# Patient Record
Sex: FEMALE | Race: WHITE | NOT HISPANIC OR LATINO
[De-identification: names, ages, dates, MRNs, and addresses within clinical notes are randomized per-mention and may not be internally consistent; named-entity substitution may affect disease eponyms.]

---

## 2019-08-28 PROBLEM — Z00.00 ENCOUNTER FOR PREVENTIVE HEALTH EXAMINATION: Status: ACTIVE | Noted: 2019-08-28

## 2019-08-29 ENCOUNTER — APPOINTMENT (OUTPATIENT)
Dept: NEUROSURGERY | Facility: CLINIC | Age: 79
End: 2019-08-29
Payer: MEDICARE

## 2019-08-29 VITALS
SYSTOLIC BLOOD PRESSURE: 124 MMHG | BODY MASS INDEX: 26.68 KG/M2 | RESPIRATION RATE: 18 BRPM | OXYGEN SATURATION: 94 % | HEIGHT: 62 IN | DIASTOLIC BLOOD PRESSURE: 82 MMHG | HEART RATE: 97 BPM | WEIGHT: 145 LBS

## 2019-08-29 DIAGNOSIS — Z83.3 FAMILY HISTORY OF DIABETES MELLITUS: ICD-10-CM

## 2019-08-29 DIAGNOSIS — Z87.39 PERSONAL HISTORY OF OTHER DISEASES OF THE MUSCULOSKELETAL SYSTEM AND CONNECTIVE TISSUE: ICD-10-CM

## 2019-08-29 DIAGNOSIS — R51 HEADACHE: ICD-10-CM

## 2019-08-29 DIAGNOSIS — Z82.61 FAMILY HISTORY OF ARTHRITIS: ICD-10-CM

## 2019-08-29 DIAGNOSIS — Z86.79 PERSONAL HISTORY OF OTHER DISEASES OF THE CIRCULATORY SYSTEM: ICD-10-CM

## 2019-08-29 DIAGNOSIS — Z82.49 FAMILY HISTORY OF ISCHEMIC HEART DISEASE AND OTHER DISEASES OF THE CIRCULATORY SYSTEM: ICD-10-CM

## 2019-08-29 DIAGNOSIS — F17.200 NICOTINE DEPENDENCE, UNSPECIFIED, UNCOMPLICATED: ICD-10-CM

## 2019-08-29 PROCEDURE — 99205 OFFICE O/P NEW HI 60 MIN: CPT

## 2019-09-03 PROBLEM — Z87.39 HISTORY OF ARTHRITIS: Status: RESOLVED | Noted: 2019-08-30 | Resolved: 2019-09-03

## 2019-09-03 PROBLEM — F17.200 CURRENT EVERY DAY SMOKER: Status: ACTIVE | Noted: 2019-08-30

## 2019-09-03 PROBLEM — Z83.3 FAMILY HISTORY OF DIABETES MELLITUS: Status: ACTIVE | Noted: 2019-08-30

## 2019-09-03 PROBLEM — Z86.79 HISTORY OF CORONARY ARTERY DISEASE: Status: RESOLVED | Noted: 2019-08-30 | Resolved: 2019-09-03

## 2019-09-03 PROBLEM — Z82.61 FAMILY HISTORY OF ARTHRITIS: Status: ACTIVE | Noted: 2019-08-30

## 2019-09-03 PROBLEM — Z82.49 FAMILY HISTORY OF CARDIAC DISORDER: Status: ACTIVE | Noted: 2019-08-30

## 2019-09-03 LAB — PA ADP PRP-ACNC: 200 PRU

## 2019-09-03 RX ORDER — ATORVASTATIN CALCIUM 80 MG/1
TABLET, FILM COATED ORAL
Refills: 0 | Status: ACTIVE | COMMUNITY

## 2019-09-03 RX ORDER — CLOPIDOGREL 75 MG/1
TABLET, FILM COATED ORAL
Refills: 0 | Status: ACTIVE | COMMUNITY

## 2019-09-03 RX ORDER — ASPIRIN ENTERIC COATED TABLETS 81 MG 81 MG/1
81 TABLET, DELAYED RELEASE ORAL
Refills: 0 | Status: ACTIVE | COMMUNITY

## 2019-09-03 NOTE — REVIEW OF SYSTEMS
[As Noted in HPI] : as noted in HPI [Facial Weakness] : no facial weakness [Feeling Poorly] : feeling poorly [Leg Weakness] : no leg weakness [Difficulties in Speech] : no speech difficulties [Seizures] : no convulsions [Cluster Headache] : cluster headaches [Lightheadedness] : no lightheadedness [Dizziness] : no dizziness [Migraine Headache] : migraine headaches [Tension Headache] : tension-type headaches [Negative] : Heme/Lymph

## 2019-09-03 NOTE — PHYSICAL EXAM
[General Appearance - In No Acute Distress] : in no acute distress [General Appearance - Alert] : alert [Oriented To Time, Place, And Person] : oriented to person, place, and time [Impaired Insight] : insight and judgment were intact [I: Normal Smell] : smell intact bilaterally [Cranial Nerves Optic (II)] : visual acuity intact bilaterally,  pupils equal round and reactive to light [Cranial Nerves Trigeminal (V)] : facial sensation intact symmetrically [Cranial Nerves Oculomotor (III)] : extraocular motion intact [Cranial Nerves Facial (VII)] : face symmetrical [Cranial Nerves Vestibulocochlear (VIII)] : hearing was intact bilaterally [Cranial Nerves Accessory (XI - Cranial And Spinal)] : head turning and shoulder shrug symmetric [Cranial Nerves Glossopharyngeal (IX)] : tongue and palate midline [Cranial Nerves Hypoglossal (XII)] : there was no tongue deviation with protrusion [Motor Tone] : muscle tone was normal in all four extremities [Motor Strength] : muscle strength was normal in all four extremities [Sensation Tactile Decrease] : light touch was intact [Sclera] : the sclera and conjunctiva were normal [Neck Appearance] : the appearance of the neck was normal [Heart Rate And Rhythm] : heart rate was normal and rhythm regular [Exaggerated Use Of Accessory Muscles For Inspiration] : no accessory muscle use [] : no respiratory distress [Abdomen Soft] : soft [Abnormal Walk] : normal gait [Skin Color & Pigmentation] : normal skin color and pigmentation

## 2019-09-03 NOTE — ADDENDUM
[FreeTextEntry1] : August 29, 2019\par \par Javad Loja MD\par [address]\[place] [address]\par \par Re:	Afia Quigley \par \par Dear Dr. Loja:\par \par I saw Afia Qugiley and her friend in the office today.  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 Quigley reports that she had undergone coiling in 2016 and 2017 by Dr. Oh at [facility] and now presents with MRA evidence of compaction and has been considered for a 3rd procedure.  Given the complexity of the recommendation, she now comes for a 2nd opinion.  \par \par With the exception of coronary artery disease and arthritis, she really has no other major medical problems.  Her current medications are every-other-day Plavix and aspirin as well as rosuvastatin.  She does not drink and does smoke occasionally.  She reports that she is having some memory issues, decreased concentration, cluster headache, limping, and head pressure but no true neurological deficit.  She denies any cardiovascular, respiratory, or GI complaints.  Neurologically, she is intact.\par \par I had the opportunity to review her most recent MRA as well as her prior angiograms.  The angiograms show, in fact, quite good coil embolization of what looks to be a wide-necked 8 mm anterior communicating artery aneurysm.  Unfortunately, the most recent MRA does show a coil compaction with a significant amount of residual aneurysm at the base.\par \par I had a long discussion with Afia.  I do think that early treatment with a stent coil option makes the most sense given the 2nd recurrence and the wide neck of the aneurysm.  While I think watchful waiting could be considered, given that this is a 2nd recurrence in a short period of time, I do think that this is somewhat more risky than a straightforward recoiling using stent support.  I explained the risks, benefits, and alternatives at length.  We additionally reviewed her MRI which shows no evidence of any other abnormality that might be causing her headache, and I think she would like to make a decision as to whether to go ahead after thinking it through with her family and friends.\par \par I will certainly keep you abreast of her progress and appreciate your kind referral.\par \par Sincerely,\par \par \par \par Miguel Lowe MD\par \par DL/ag [facility] #[id number]\par \par CC:	Mihir Villalba MD\par 	[address]\par 	[address]\[place] [address]\par

## 2019-09-03 NOTE — DATA REVIEWED
[de-identified] : HEAD 8/22/19: Status post  endovascular colining of Anterior communicating artery aneurysm with interval revascularization given the new flow related signal in the coil mass (SEE REPORT FOR MORE DETAILS) [de-identified] : cerebral Angiogram: Demonstrate recanalization in coil mass

## 2019-09-03 NOTE — HISTORY OF PRESENT ILLNESS
[FreeTextEntry1] : Persistent Head Pressure [de-identified] : This is a 79 year old woman here for comprehensive evaluations of previously coiled UNRUPTURED anterior communicating artery aneurysm at outside facility at [facility] by Dr Oh. She comes to visit with her friend. She reports that her initial symptoms started with visual difficulty in 2016. She underwent comprehensive diagnostic workup that showed a diagnostic cerebral aneurysm. She underwent endovascular Coiled embolization of cerebral aneurysm in 2016 and then a repeat procedure in 2017. She reports that she fell 6-8 weeks ago  and started to experience head pressure. She follow up with Dr. Oh and Head CT and NEW MRA head ordered to evaluate. No intracranial hemorrhage. She denies any gross neurological deficits.

## 2019-10-10 LAB
PA ADP PRP-ACNC: 180 PRU
PLATELET RESPONSE ASPIRIN: 563 ARU

## 2019-10-11 ENCOUNTER — INPATIENT (INPATIENT)
Facility: HOSPITAL | Age: 79
LOS: 0 days | Discharge: HOME | DRG: 253 | End: 2019-10-12
Attending: NEUROLOGICAL SURGERY | Admitting: NEUROLOGICAL SURGERY
Payer: COMMERCIAL

## 2019-10-11 VITALS
SYSTOLIC BLOOD PRESSURE: 113 MMHG | DIASTOLIC BLOOD PRESSURE: 75 MMHG | OXYGEN SATURATION: 98 % | TEMPERATURE: 98 F | HEART RATE: 100 BPM | RESPIRATION RATE: 21 BRPM

## 2019-10-11 DIAGNOSIS — L76.32 POSTPROCEDURAL HEMATOMA OF SKIN AND SUBCUTANEOUS TISSUE FOLLOWING OTHER PROCEDURE: ICD-10-CM
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
## 2019-10-11 DIAGNOSIS — I67.1 CEREBRAL ANEURYSM, NONRUPTURED: ICD-10-CM

## 2019-10-11 DIAGNOSIS — Z95.5 PRESENCE OF CORONARY ANGIOPLASTY IMPLANT AND GRAFT: ICD-10-CM

## 2019-10-11 DIAGNOSIS — E78.5 HYPERLIPIDEMIA, UNSPECIFIED: ICD-10-CM

## 2019-10-11 DIAGNOSIS — F17.210 NICOTINE DEPENDENCE, CIGARETTES, UNCOMPLICATED: ICD-10-CM

## 2019-10-11 DIAGNOSIS — Z98.890 OTHER SPECIFIED POSTPROCEDURAL STATES: Chronic | ICD-10-CM

## 2019-10-11 DIAGNOSIS — I72.8 ANEURYSM OF OTHER SPECIFIED ARTERIES: ICD-10-CM

## 2019-10-11 DIAGNOSIS — I25.10 ATHEROSCLEROTIC HEART DISEASE OF NATIVE CORONARY ARTERY WITHOUT ANGINA PECTORIS: ICD-10-CM
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
## 2019-10-11 LAB
APTT BLD: 98.2 SEC — HIGH (ref 27.5–36.3)
HCT VFR BLD CALC: 35.9 % — SIGNIFICANT CHANGE UP (ref 34.5–45)
HGB BLD-MCNC: 11.8 G/DL — SIGNIFICANT CHANGE UP (ref 11.5–15.5)
INR BLD: 1.11 — SIGNIFICANT CHANGE UP (ref 0.88–1.16)
MCHC RBC-ENTMCNC: 30.6 PG — SIGNIFICANT CHANGE UP (ref 27–34)
MCHC RBC-ENTMCNC: 32.9 GM/DL — SIGNIFICANT CHANGE UP (ref 32–36)
MCV RBC AUTO: 93.2 FL — SIGNIFICANT CHANGE UP (ref 80–100)
NRBC # BLD: 0 /100 WBCS — SIGNIFICANT CHANGE UP (ref 0–0)
PA ADP PRP-ACNC: 189 PRU — LOW (ref 194–418)
PLATELET # BLD AUTO: 171 K/UL — SIGNIFICANT CHANGE UP (ref 150–400)
PLATELET RESPONSE ASPIRIN RESULT: 401 ARU — SIGNIFICANT CHANGE UP (ref 350–700)
PROTHROM AB SERPL-ACNC: 12.6 SEC — SIGNIFICANT CHANGE UP (ref 10–12.9)
RBC # BLD: 3.85 M/UL — SIGNIFICANT CHANGE UP (ref 3.8–5.2)
RBC # FLD: 13.1 % — SIGNIFICANT CHANGE UP (ref 10.3–14.5)
WBC # BLD: 6.99 K/UL — SIGNIFICANT CHANGE UP (ref 3.8–10.5)
WBC # FLD AUTO: 6.99 K/UL — SIGNIFICANT CHANGE UP (ref 3.8–10.5)

## 2019-10-11 PROCEDURE — 76377 3D RENDER W/INTRP POSTPROCES: CPT | Mod: 26

## 2019-10-11 PROCEDURE — 36217 PLACE CATHETER IN ARTERY: CPT

## 2019-10-11 PROCEDURE — 75898 FOLLOW-UP ANGIOGRAPHY: CPT | Mod: 26

## 2019-10-11 PROCEDURE — 99291 CRITICAL CARE FIRST HOUR: CPT

## 2019-10-11 PROCEDURE — 61624 TCAT PERM OCCLS/EMBOLJ CNS: CPT

## 2019-10-11 PROCEDURE — 99222 1ST HOSP IP/OBS MODERATE 55: CPT

## 2019-10-11 PROCEDURE — 75894 X-RAYS TRANSCATH THERAPY: CPT | Mod: 26

## 2019-10-11 RX ORDER — ACETAMINOPHEN 500 MG
325 TABLET ORAL ONCE
Refills: 0 | Status: COMPLETED | OUTPATIENT
Start: 2019-10-11 | End: 2019-10-11

## 2019-10-11 RX ORDER — DOCUSATE SODIUM 100 MG
100 CAPSULE ORAL
Refills: 0 | Status: DISCONTINUED | OUTPATIENT
Start: 2019-10-11 | End: 2019-10-12

## 2019-10-11 RX ORDER — ASPIRIN/CALCIUM CARB/MAGNESIUM 324 MG
325 TABLET ORAL AT BEDTIME
Refills: 0 | Status: DISCONTINUED | OUTPATIENT
Start: 2019-10-11 | End: 2019-10-11

## 2019-10-11 RX ORDER — ASPIRIN/CALCIUM CARB/MAGNESIUM 324 MG
81 TABLET ORAL DAILY
Refills: 0 | Status: DISCONTINUED | OUTPATIENT
Start: 2019-10-11 | End: 2019-10-11

## 2019-10-11 RX ORDER — ASPIRIN/CALCIUM CARB/MAGNESIUM 324 MG
1 TABLET ORAL
Qty: 0 | Refills: 0 | DISCHARGE

## 2019-10-11 RX ORDER — ONDANSETRON 8 MG/1
4 TABLET, FILM COATED ORAL EVERY 6 HOURS
Refills: 0 | Status: DISCONTINUED | OUTPATIENT
Start: 2019-10-11 | End: 2019-10-12

## 2019-10-11 RX ORDER — ASPIRIN/CALCIUM CARB/MAGNESIUM 324 MG
325 TABLET ORAL DAILY
Refills: 0 | Status: DISCONTINUED | OUTPATIENT
Start: 2019-10-11 | End: 2019-10-12

## 2019-10-11 RX ORDER — ACETAMINOPHEN 500 MG
650 TABLET ORAL EVERY 6 HOURS
Refills: 0 | Status: DISCONTINUED | OUTPATIENT
Start: 2019-10-11 | End: 2019-10-12

## 2019-10-11 RX ORDER — DIAZEPAM 5 MG
1 TABLET ORAL
Qty: 0 | Refills: 0 | DISCHARGE

## 2019-10-11 RX ORDER — CLOPIDOGREL BISULFATE 75 MG/1
75 TABLET, FILM COATED ORAL DAILY
Refills: 0 | Status: DISCONTINUED | OUTPATIENT
Start: 2019-10-11 | End: 2019-10-12

## 2019-10-11 RX ORDER — ERYTHROMYCIN BASE 5 MG/GRAM
1 OINTMENT (GRAM) OPHTHALMIC (EYE)
Refills: 0 | Status: DISCONTINUED | OUTPATIENT
Start: 2019-10-11 | End: 2019-10-11

## 2019-10-11 RX ORDER — SODIUM CHLORIDE 9 MG/ML
1000 INJECTION INTRAMUSCULAR; INTRAVENOUS; SUBCUTANEOUS
Refills: 0 | Status: DISCONTINUED | OUTPATIENT
Start: 2019-10-11 | End: 2019-10-12

## 2019-10-11 RX ORDER — ATORVASTATIN CALCIUM 80 MG/1
40 TABLET, FILM COATED ORAL DAILY
Refills: 0 | Status: DISCONTINUED | OUTPATIENT
Start: 2019-10-11 | End: 2019-10-12

## 2019-10-11 RX ORDER — ROSUVASTATIN CALCIUM 5 MG/1
1 TABLET ORAL
Qty: 0 | Refills: 0 | DISCHARGE

## 2019-10-11 RX ORDER — ERYTHROMYCIN BASE 5 MG/GRAM
1 OINTMENT (GRAM) OPHTHALMIC (EYE)
Refills: 0 | Status: DISCONTINUED | OUTPATIENT
Start: 2019-10-11 | End: 2019-10-12

## 2019-10-11 RX ORDER — CHLORHEXIDINE GLUCONATE 213 G/1000ML
1 SOLUTION TOPICAL ONCE
Refills: 0 | Status: DISCONTINUED | OUTPATIENT
Start: 2019-10-11 | End: 2019-10-12

## 2019-10-11 RX ORDER — BENZOCAINE AND MENTHOL 5; 1 G/100ML; G/100ML
1 LIQUID ORAL
Refills: 0 | Status: DISCONTINUED | OUTPATIENT
Start: 2019-10-11 | End: 2019-10-12

## 2019-10-11 RX ORDER — SENNA PLUS 8.6 MG/1
2 TABLET ORAL AT BEDTIME
Refills: 0 | Status: DISCONTINUED | OUTPATIENT
Start: 2019-10-11 | End: 2019-10-12

## 2019-10-11 RX ADMIN — Medication 325 MILLIGRAM(S): at 21:16

## 2019-10-11 RX ADMIN — SENNA PLUS 2 TABLET(S): 8.6 TABLET ORAL at 21:16

## 2019-10-11 RX ADMIN — Medication 1 DROP(S): at 19:35

## 2019-10-11 RX ADMIN — CLOPIDOGREL BISULFATE 75 MILLIGRAM(S): 75 TABLET, FILM COATED ORAL at 21:16

## 2019-10-11 RX ADMIN — Medication 325 MILLIGRAM(S): at 22:44

## 2019-10-11 RX ADMIN — Medication 1 APPLICATION(S): at 20:03

## 2019-10-11 NOTE — H&P ADULT - NSICDXPASTMEDICALHX_GEN_ALL_CORE_FT
PAST MEDICAL HISTORY:  CAD (coronary artery disease)     Cerebral aneurysm ACOMM    HLD (hyperlipidemia)

## 2019-10-11 NOTE — CHART NOTE - NSCHARTNOTEFT_GEN_A_CORE
Neurosurgical Indications for Screening Dopplers on Admission:       1) Known hypercoagulation disorder (h/o VTE, thrombophilia, HIT, etc.)   2) Admitted from prolonged stay from another institution (straight forward ER transfers not included)  3) Presenting with significant leg immobility   4) Presenting with signs and symptoms of VTE?    5) With significant critical illness, Including "found down" for unknown period of time in HPI  6) With significant neurotrauma (TBI, SCI / TLS spine fractures)   7) Who are comatose   8) With known malignancy (e.g. glioblastoma multiforme, meningioma, etc.). Excludes IA chemo 23hr observation stays  9) On hemodialysis   10) Who have received platelet transfusion or antithrombotic reversal agents recently   11) Who have had recent major orthopedic surgery          Screening dopplers indicated?   Y _   N x  DVT Prophylaxis:  x SCD's   _ chemoprophylaxis

## 2019-10-11 NOTE — H&P ADULT - PROBLEM SELECTOR PLAN 3
Consent in chart, all R/B/A discussed,  Outpatient medical clearance reviewed,  Accumetrics performed pre-procedure,  Plan to admit to ICU post procedure,  D/w Dr. Lowe

## 2019-10-11 NOTE — H&P ADULT - HISTORY OF PRESENT ILLNESS
79 year old female with PMH of HLD, CAD s/p cardiac stents, h/o ACOMM artery aneurysm s/p coiling x2 presents today for possible stent and coil of ACOMM artery aneurysm. Patient has been on Aspirin 325mg and Plavix 75mg daily in anticipation of this procedure and has taken her medication this morning. She denies any headache, visual changes, extremity weakness, syncope, or gait changes. 79 year old female with PMH of HLD, CAD s/p cardiac stents, h/o ACOMM artery aneurysm s/p coiling x2 presents today for possible stent and coil of ACOMM artery aneurysm. Patient has been on Aspirin 81mg and Plavix 75mg daily in anticipation of this procedure and has taken a full dose Aspirin this morning for repeat accumetrics. Her aneurysm was originally discovered for work-up for headaches and visual changes. She reports intermittent mild headaches but otherwise no new neurological symptoms.

## 2019-10-11 NOTE — CONSULT NOTE ADULT - ASSESSMENT
Assessment: 79yFemale with PMH of HLD, CAD s/p cardiac stents, h/o ACOMM artery aneurysm  s/p femoral cerebral angiogram ACOMM artery stent placement now w/ R groin hematoma.    - 2 discrete Hematomas size of a quarter lateral and medial thigh  -Continue to monitor q1hr  -trend cbc (hgb 11 postop)  -continue supine bedrest  -will con't to follow

## 2019-10-11 NOTE — PROGRESS NOTE ADULT - SUBJECTIVE AND OBJECTIVE BOX
79/F with hx of CAD s/p PCI, Acomm aneurysm s/p 2 prior coiling now here for re-occlusion of aneurysm via stenting.  Was on ASA and plavix prior to procedure.      Post-op with groin hematoma    Femoral cerebral angiogram performed under general anesthesia using a 6Fr long sheath via right CFA. Left ICA injection with 3D spin performed demonstrates previously coiled ACOMM artery aneurysm. [place] stent 3x24mm deployed covering the ACOMM artery. Patient was Heparinized for the procedure and stopped at the end of the procedure, ACT at the end was 301. Patient successfully extubated and remains neurologically and hemodynamically stable.      PMH:   aneurysm per above  CAD  HLD  CAD s/p cath (stents?    Allergies: No Known Allergies          VITALS:  T(C): 36.5 (10-11-19 @ 15:03), Max: 36.5 (10-11-19 @ 15:03)  HR: 78 (10-11-19 @ 16:18) (78 - 100)  BP: 128/78 (10-11-19 @ 16:18) (110/70 - 128/78)  RR: 21 (10-11-19 @ 16:18) (14 - 21)  SpO2: 99% (10-11-19 @ 16:18) (97% - 99%)    EXAM:        MEDICATIONS:  acetaminophen   Tablet .. 650 milliGRAM(s) Oral every 6 hours PRN  aspirin 325 milliGRAM(s) Oral daily  atorvastatin 40 milliGRAM(s) Oral daily  chlorhexidine 4% Liquid 1 Application(s) Topical once  clopidogrel Tablet 75 milliGRAM(s) Oral daily  docusate sodium 100 milliGRAM(s) Oral two times a day  ondansetron Injectable 4 milliGRAM(s) IV Push every 6 hours PRN  senna 2 Tablet(s) Oral at bedtime  sodium chloride 0.9%. 1000 milliLiter(s) IV Continuous <Continuous>      I/O's    10-11-19 @ 07:01  -  10-11-19 @ 16:34  --------------------------------------------------------  IN: 900 mL / OUT: 580 mL / NET: 320 mL        Ventilator data:        LABS:                    CAPILLARY BLOOD GLUCOSE 79/F with hx of CAD s/p PCI, Acomm aneurysm s/p 2 prior coiling now here for re-occlusion of aneurysm via stenting.  Was on ASA and plavix prior to procedure.      Post-op with groin hematoma, stabilized with local pressure.    Operative note: Femoral cerebral angiogram performed under general anesthesia using a 6Fr long sheath via right CFA. Left ICA injection with 3D spin performed demonstrates previously coiled ACOMM artery aneurysm. [place] stent 3x24mm deployed covering the ACOMM artery. Patient was Heparinized for the procedure and stopped at the end of the procedure, ACT at the end was 301. Patient successfully extubated and remains neurologically and hemodynamically stable.      PMH:   aneurysm per above  CAD  HLD  CAD s/p cath (stents?    Allergies: No Known Allergies          VITALS:  T(C): 36.5 (10-11-19 @ 15:03), Max: 36.5 (10-11-19 @ 15:03)  HR: 78 (10-11-19 @ 16:18) (78 - 100)  BP: 128/78 (10-11-19 @ 16:18) (110/70 - 128/78)  RR: 21 (10-11-19 @ 16:18) (14 - 21)  SpO2: 99% (10-11-19 @ 16:18) (97% - 99%)    EXAM:  Gen: elderly woman in NAD  CV: RRR  Abd: soft NTND  Skin: two vascular malformations vs telengeictasias vs other dermatologic abnormality noted on left side of tongue and left anterior shoulder    MSE: awake, alert, oriented x3, follows multistep commands, attention normal, language fluent with intact naming  CN: PHILLY, EOMI, VFF, face symmetric, TUP midline, no dysarthria  Motor: no pronator drift, full strength to confrontation in all extremities  Sensory: intact to LT throughout         MEDICATIONS:  acetaminophen   Tablet .. 650 milliGRAM(s) Oral every 6 hours PRN  aspirin 325 milliGRAM(s) Oral daily  atorvastatin 40 milliGRAM(s) Oral daily  chlorhexidine 4% Liquid 1 Application(s) Topical once  clopidogrel Tablet 75 milliGRAM(s) Oral daily  docusate sodium 100 milliGRAM(s) Oral two times a day  ondansetron Injectable 4 milliGRAM(s) IV Push every 6 hours PRN  senna 2 Tablet(s) Oral at bedtime  sodium chloride 0.9%. 1000 milliLiter(s) IV Continuous <Continuous>      I/O's    10-11-19 @ 07:01  -  10-11-19 @ 16:34  --------------------------------------------------------  IN: 900 mL / OUT: 580 mL / NET: 320 mL    Labs:

## 2019-10-11 NOTE — PROGRESS NOTE ADULT - ASSESSMENT
79/F with hx of CAD s/p PCI, Acomm aneurysm s/p 2 prior coiling now here for re-occlusion of aneurysm via stenting.  post-op with R groin hematoma.      Neuro  -q1h neuro checks  -groin pressure, groin checks, keep flat for 6 hours    CV  normotension  -EKG - obtain baseline  -cont home statin    Pulm  -incentive spirometry    ID  afebrile, monitor clinically    Heme  -SCD's  -defer pharmacologic DVT ppx for 24h post op  -cont ASA and plavix    GI  -ADAT    Renal  -goal normonatremia  -IVF's until taking good PO intake    Access:  PIV  Calix --> dc by AM 79/F with hx of CAD s/p PCI, Acomm aneurysm s/p 2 prior coiling now here for re-occlusion of aneurysm via stenting..  post-op with R groin hematoma.      Neuro  -q1h neuro checks  -groin pressure, groin checks, keep flat for 6 hours  -continue ASA and Plavix    CV  normotension  -EKG - obtain baseline  -cont home statin    Pulm  -incentive spirometry    ID  afebrile, monitor clinically    Heme  -SCD's  -defer pharmacologic DVT ppx for 24h post op  -cont ASA and plavix    GI  -ADAT    Renal  -goal normonatremia  -IVF's until taking good PO intake    Access:  PIV  Calix --> dc by AM

## 2019-10-11 NOTE — CONSULT NOTE ADULT - SUBJECTIVE AND OBJECTIVE BOX
Vascular Attending:  Paco    HPI:  79 year old female with PMH of HLD, CAD, h/o ACOMM artery aneurysm s/p femoral cerebral angiogram ACOMM artery stent placement POD#0.  Prior to procedure patient has been on Aspirin 81mg and Plavix 75mg daily in anticipation of this procedure and has taken a full dose Aspirin this am. Aneurysm was originally discovered for work-up for headaches and visual changes.  Vascular consulted to evaluate R groin hematoma noticed 1hr after procedure.  Pt had R groin access and perclosed, once hematoma discovered manual pressure was applied for 15-20 min followed by a balloon pressure dressing.  Hematoma remained stable in size. pt denies pain to groin.    PAST MEDICAL & SURGICAL HISTORY:  Cerebral aneurysm: ACOMM  CAD (coronary artery disease)  HLD (hyperlipidemia)  S/P cardiac catheterization      REVIEW OF SYSTEMS  Negative except as in HPI    Musculoskeletal:	    Neurological:	    Psychiatric:	    Hematology/Lymphatics:	    Endocrine:	    Allergic/Immunologic:	    MEDICATIONS  (STANDING):  artificial  tears Solution 1 Drop(s) Both EYES two times a day  aspirin 325 milliGRAM(s) Oral daily  atorvastatin 40 milliGRAM(s) Oral daily  bisacodyl Suppository 10 milliGRAM(s) Rectal once  chlorhexidine 4% Liquid 1 Application(s) Topical once  clopidogrel Tablet 75 milliGRAM(s) Oral daily  docusate sodium 100 milliGRAM(s) Oral two times a day  erythromycin   Ointment 1 Application(s) Both EYES two times a day  famotidine    Tablet 20 milliGRAM(s) Oral once  senna 2 Tablet(s) Oral at bedtime  sodium chloride 0.9%. 1000 milliLiter(s) (100 mL/Hr) IV Continuous <Continuous>    MEDICATIONS  (PRN):  acetaminophen   Tablet .. 650 milliGRAM(s) Oral every 6 hours PRN Temp greater or equal to 38.5C (101.3F), Moderate Pain (4 - 6)  benzocaine 15 mG/menthol 3.6 mG Lozenge 1 Lozenge Oral two times a day PRN Sore Throat  ondansetron Injectable 4 milliGRAM(s) IV Push every 6 hours PRN Nausea and/or Vomiting      Allergies  No Known Allergies    SOCIAL HISTORY:    FAMILY HISTORY:  No pertinent family history in first degree relatives      Vital Signs Last 24 Hrs  T(C): 37.2 (12 Oct 2019 00:23), Max: 37.2 (12 Oct 2019 00:23)  T(F): 98.9 (12 Oct 2019 00:23), Max: 98.9 (12 Oct 2019 00:23)  HR: 74 (12 Oct 2019 01:00) (74 - 100)  BP: 116/59 (12 Oct 2019 00:00) (108/79 - 129/74)  BP(mean): 83 (12 Oct 2019 00:00) (82 - 108)  RR: 18 (12 Oct 2019 01:00) (14 - 21)  SpO2: 97% (12 Oct 2019 01:00) (96% - 99%)    PHYSICAL EXAM:    Constitutional: Pt AXOX3 in NAD  Respiratory: unlabored  Cardiovascular: S1S2  Extremities: R groin small hematoma lateral and medial thigh, no pain to deep palpation, some ecchymosis  Vascular: triphasic fem, palp dp  Neurological: intact    LABS:                        11.2   5.31  )-----------( 157      ( 12 Oct 2019 00:30 )             33.5           PT/INR - ( 11 Oct 2019 18:59 )   PT: 12.6 sec;   INR: 1.11          PTT - ( 11 Oct 2019 18:59 )  PTT:98.2 sec      RADIOLOGY & ADDITIONAL STUDIES

## 2019-10-11 NOTE — BRIEF OPERATIVE NOTE - OPERATION/FINDINGS
Femoral cerebral angiogram performed under general anesthesia using a 6Fr long sheath via right CFA. Left ICA injection with 3D spin performed demonstrates previously coiled ACOMM artery aneurysm. [place] stent 3x24mm deployed covering the ACOMM artery. Patient was Heparinized for the procedure and stopped at the end of the procedure, ACT at the end was 301. Patient successfully extubated and remains neurologically and hemodynamically stable.    Full report to follow, d/w Dr. Lowe

## 2019-10-11 NOTE — PACU DISCHARGE NOTE - COMMENTS
Pt met criteria for discharge- alert and oriented x4- follow commands- moving all extremities-no neuro deficits noted- palpable pulses on B/L LE (DP and PD)- small hematoma note don right groin- PA Ahmed at bedside and holding pressure- Pt hemodynamically stable at present- Report given to 8east RN- Pt left unit via bed on Zoll to 812

## 2019-10-11 NOTE — H&P ADULT - NSHPPHYSICALEXAM_GEN_ALL_CORE
Gen: NAD, AAOx3  HEENT: PERRL. EOMI. VF intact. NC/AT.  Neck: Supple, FROM  Lungs: Clear b/l, no W/R/R.  Heart: S1, S2. NSR.  Abd: Soft, NT/ND. +BS  Exts: Pulses 2+ throughout, no C/C/E.  Neuro: CNs II-XII intact. 5/5 str x4 extremities. Sensation to LT intact. Following commands. Gait intact. Speech clear.

## 2019-10-11 NOTE — PROVIDER CONTACT NOTE (CHANGE IN STATUS NOTIFICATION) - SITUATION
patient seen with saturated dressing on right groin. +2 pedal pulses , extremities warm-to-touch.  neuro intact.

## 2019-10-11 NOTE — PROGRESS NOTE ADULT - ATTENDING COMMENTS
ATTENDING ATTESTATION:  I was physically present for the key portions of the evaluation and management (E/M) service provided.  I agree with the above history, physical and plan, which I have reviewed and edited where appropriate.  Patient at high risk for neurological deterioration or death due to:  post-operative hemorrhage or stroke    Critical care time:  I have personally provided  35  minutes of critical care time, excluding time spent on separately-billable procedures.      Plan discussed with RN, PA team.

## 2019-10-11 NOTE — H&P ADULT - NSHPSOCIALHISTORY_GEN_ALL_CORE
Denies ETOH abuse. Smokes 2 cigarettes a day. No illicit drug abuse. Lives alone but has family support available.

## 2019-10-11 NOTE — BRIEF OPERATIVE NOTE - NSICDXBRIEFPROCEDURE_GEN_ALL_CORE_FT
PROCEDURES:  Angiogram, cerebral, with stent insertion 11-Oct-2019 14:28:49 ACOMM artery stent Deepak Machuca

## 2019-10-11 NOTE — PROGRESS NOTE ADULT - SUBJECTIVE AND OBJECTIVE BOX
NEUROSURGERY POST OP NOTE:    POD# 0 S/P femoral cerebral angiogram ACOMM artery stent placement.    S: Pt seen and examined at bedside in PACU. Pt reports right eye irritation and sore throat. Pt denies acute weakness/loss of sensation of extremities. Pt found to have a small right groin hematoma upon examination of right groin. Pressure applied at groin site for 15minutes.       T(C): 36.5 (10-11-19 @ 15:03), Max: 36.5 (10-11-19 @ 15:03)  HR: 78 (10-11-19 @ 16:18) (78 - 100)  BP: 128/78 (10-11-19 @ 16:18) (110/70 - 128/78)  RR: 21 (10-11-19 @ 16:18) (14 - 21)  SpO2: 99% (10-11-19 @ 16:18) (97% - 99%)      10-11-19 @ 07:01  -  10-11-19 @ 17:00  --------------------------------------------------------  IN: 900 mL / OUT: 580 mL / NET: 320 mL        acetaminophen   Tablet .. 650 milliGRAM(s) Oral every 6 hours PRN  artificial  tears Solution 1 Drop(s) Right EYE two times a day  aspirin 325 milliGRAM(s) Oral daily  atorvastatin 40 milliGRAM(s) Oral daily  benzocaine 15 mG/menthol 3.6 mG Lozenge 1 Lozenge Oral two times a day PRN  chlorhexidine 4% Liquid 1 Application(s) Topical once  clopidogrel Tablet 75 milliGRAM(s) Oral daily  docusate sodium 100 milliGRAM(s) Oral two times a day  erythromycin   Ointment 1 Application(s) Right EYE two times a day  ondansetron Injectable 4 milliGRAM(s) IV Push every 6 hours PRN  senna 2 Tablet(s) Oral at bedtime  sodium chloride 0.9%. 1000 milliLiter(s) IV Continuous <Continuous>    Exam:  Head: NC/AT  Neurological: AAOx3, FC, speech coherent  CNII-XII: EOM intact, PERRL, face symmetric, sclera anicteric/non-hemorrhagic  Motor: MAEx4 5/5 UE and LE B/L  SILT throughout  R groin with small hematoma, dressing in place  DP/PT pulses 3+ and symmetric    DEVICES:   left a-line  PIVs    Assessment: 79yFemale with PMH of HLD, CAD s/p cardiac stents, h/o ACOMM artery aneurysm s/p coiling x2, now s/p femoral cerebral angiogram ACOMM artery stent placement    Plan:  -neuro/groin/vascular checks  -pain control  -bedrest x6 hrs postop  -continue aspirin and plavix  -normotensive BP goals  -nasal canula prn  -ADAT, IVF hydration for now  -bowel regimen: colace, senna  -DVT prophylaxis: SCDs  -D/w Dr. Lowe and Dr. Guzmán

## 2019-10-11 NOTE — H&P ADULT - ASSESSMENT
79 year old female with HLD, CAD s/p cardiac stents with previously coiled ACOMM artery aneurysm now presenting for cerebral angiogram for stent coiling of ACOMM artery aneurysm.

## 2019-10-12 ENCOUNTER — TRANSCRIPTION ENCOUNTER (OUTPATIENT)
Age: 79
End: 2019-10-12

## 2019-10-12 VITALS
RESPIRATION RATE: 16 BRPM | HEART RATE: 80 BPM | SYSTOLIC BLOOD PRESSURE: 138 MMHG | DIASTOLIC BLOOD PRESSURE: 77 MMHG | OXYGEN SATURATION: 96 %

## 2019-10-12 LAB
ANION GAP SERPL CALC-SCNC: 9 MMOL/L — SIGNIFICANT CHANGE UP (ref 5–17)
BUN SERPL-MCNC: 9 MG/DL — SIGNIFICANT CHANGE UP (ref 7–23)
CALCIUM SERPL-MCNC: 8.6 MG/DL — SIGNIFICANT CHANGE UP (ref 8.4–10.5)
CHLORIDE SERPL-SCNC: 108 MMOL/L — SIGNIFICANT CHANGE UP (ref 96–108)
CO2 SERPL-SCNC: 23 MMOL/L — SIGNIFICANT CHANGE UP (ref 22–31)
CREAT SERPL-MCNC: 0.45 MG/DL — LOW (ref 0.5–1.3)
GLUCOSE SERPL-MCNC: 86 MG/DL — SIGNIFICANT CHANGE UP (ref 70–99)
HCT VFR BLD CALC: 33.5 % — LOW (ref 34.5–45)
HCT VFR BLD CALC: 34 % — LOW (ref 34.5–45)
HGB BLD-MCNC: 11 G/DL — LOW (ref 11.5–15.5)
HGB BLD-MCNC: 11.2 G/DL — LOW (ref 11.5–15.5)
MAGNESIUM SERPL-MCNC: 1.8 MG/DL — SIGNIFICANT CHANGE UP (ref 1.6–2.6)
MCHC RBC-ENTMCNC: 30.6 PG — SIGNIFICANT CHANGE UP (ref 27–34)
MCHC RBC-ENTMCNC: 30.9 PG — SIGNIFICANT CHANGE UP (ref 27–34)
MCHC RBC-ENTMCNC: 32.4 GM/DL — SIGNIFICANT CHANGE UP (ref 32–36)
MCHC RBC-ENTMCNC: 33.4 GM/DL — SIGNIFICANT CHANGE UP (ref 32–36)
MCV RBC AUTO: 92.3 FL — SIGNIFICANT CHANGE UP (ref 80–100)
MCV RBC AUTO: 94.4 FL — SIGNIFICANT CHANGE UP (ref 80–100)
NRBC # BLD: 0 /100 WBCS — SIGNIFICANT CHANGE UP (ref 0–0)
NRBC # BLD: 0 /100 WBCS — SIGNIFICANT CHANGE UP (ref 0–0)
PHOSPHATE SERPL-MCNC: 2.2 MG/DL — LOW (ref 2.5–4.5)
PLATELET # BLD AUTO: 157 K/UL — SIGNIFICANT CHANGE UP (ref 150–400)
PLATELET # BLD AUTO: 158 K/UL — SIGNIFICANT CHANGE UP (ref 150–400)
POTASSIUM SERPL-MCNC: 3.2 MMOL/L — LOW (ref 3.5–5.3)
POTASSIUM SERPL-SCNC: 3.2 MMOL/L — LOW (ref 3.5–5.3)
RBC # BLD: 3.6 M/UL — LOW (ref 3.8–5.2)
RBC # BLD: 3.63 M/UL — LOW (ref 3.8–5.2)
RBC # FLD: 13 % — SIGNIFICANT CHANGE UP (ref 10.3–14.5)
RBC # FLD: 13 % — SIGNIFICANT CHANGE UP (ref 10.3–14.5)
SODIUM SERPL-SCNC: 140 MMOL/L — SIGNIFICANT CHANGE UP (ref 135–145)
WBC # BLD: 5.31 K/UL — SIGNIFICANT CHANGE UP (ref 3.8–10.5)
WBC # BLD: 7.11 K/UL — SIGNIFICANT CHANGE UP (ref 3.8–10.5)
WBC # FLD AUTO: 5.31 K/UL — SIGNIFICANT CHANGE UP (ref 3.8–10.5)
WBC # FLD AUTO: 7.11 K/UL — SIGNIFICANT CHANGE UP (ref 3.8–10.5)

## 2019-10-12 PROCEDURE — 93926 LOWER EXTREMITY STUDY: CPT | Mod: 26,RT

## 2019-10-12 PROCEDURE — 99238 HOSP IP/OBS DSCHRG MGMT 30/<: CPT

## 2019-10-12 PROCEDURE — 99232 SBSQ HOSP IP/OBS MODERATE 35: CPT

## 2019-10-12 RX ORDER — ACETAMINOPHEN 500 MG
2 TABLET ORAL
Qty: 0 | Refills: 0 | DISCHARGE
Start: 2019-10-12

## 2019-10-12 RX ORDER — FAMOTIDINE 10 MG/ML
20 INJECTION INTRAVENOUS ONCE
Refills: 0 | Status: COMPLETED | OUTPATIENT
Start: 2019-10-12 | End: 2019-10-12

## 2019-10-12 RX ORDER — ASPIRIN/CALCIUM CARB/MAGNESIUM 324 MG
1 TABLET ORAL
Qty: 0 | Refills: 0 | DISCHARGE

## 2019-10-12 RX ORDER — ASPIRIN/CALCIUM CARB/MAGNESIUM 324 MG
1 TABLET ORAL
Qty: 0 | Refills: 0 | DISCHARGE
Start: 2019-10-12

## 2019-10-12 RX ORDER — POTASSIUM PHOSPHATE, MONOBASIC POTASSIUM PHOSPHATE, DIBASIC 236; 224 MG/ML; MG/ML
30 INJECTION, SOLUTION INTRAVENOUS ONCE
Refills: 0 | Status: COMPLETED | OUTPATIENT
Start: 2019-10-12 | End: 2019-10-12

## 2019-10-12 RX ORDER — POTASSIUM CHLORIDE 20 MEQ
10 PACKET (EA) ORAL
Refills: 0 | Status: COMPLETED | OUTPATIENT
Start: 2019-10-12 | End: 2019-10-12

## 2019-10-12 RX ADMIN — Medication 100 MILLIEQUIVALENT(S): at 12:14

## 2019-10-12 RX ADMIN — POTASSIUM PHOSPHATE, MONOBASIC POTASSIUM PHOSPHATE, DIBASIC 83.33 MILLIMOLE(S): 236; 224 INJECTION, SOLUTION INTRAVENOUS at 14:12

## 2019-10-12 RX ADMIN — Medication 10 MILLIGRAM(S): at 00:25

## 2019-10-12 RX ADMIN — Medication 1 APPLICATION(S): at 06:33

## 2019-10-12 RX ADMIN — Medication 100 MILLIEQUIVALENT(S): at 14:12

## 2019-10-12 RX ADMIN — BENZOCAINE AND MENTHOL 1 LOZENGE: 5; 1 LIQUID ORAL at 00:10

## 2019-10-12 RX ADMIN — Medication 325 MILLIGRAM(S): at 11:14

## 2019-10-12 RX ADMIN — Medication 100 MILLIEQUIVALENT(S): at 10:14

## 2019-10-12 RX ADMIN — BENZOCAINE AND MENTHOL 1 LOZENGE: 5; 1 LIQUID ORAL at 08:39

## 2019-10-12 RX ADMIN — Medication 1 DROP(S): at 17:15

## 2019-10-12 RX ADMIN — CLOPIDOGREL BISULFATE 75 MILLIGRAM(S): 75 TABLET, FILM COATED ORAL at 11:14

## 2019-10-12 RX ADMIN — ATORVASTATIN CALCIUM 40 MILLIGRAM(S): 80 TABLET, FILM COATED ORAL at 11:14

## 2019-10-12 RX ADMIN — FAMOTIDINE 20 MILLIGRAM(S): 10 INJECTION INTRAVENOUS at 01:39

## 2019-10-12 RX ADMIN — Medication 1 APPLICATION(S): at 17:15

## 2019-10-12 RX ADMIN — Medication 1 DROP(S): at 06:32

## 2019-10-12 NOTE — PROGRESS NOTE ADULT - ASSESSMENT
79/F with hx of CAD s/p PCI, Acomm aneurysm s/p 2 prior coiling now here for re-occlusion of aneurysm via stenting..  post-op with R groin hematoma.      Neuro  -q1h neuro checks  -RLE doppler to r/o pseudoaneurys  -continue ASA and Plavix    CV  normotension  -EKG - obtain baseline  -cont home statin    Pulm  -incentive spirometry    ID  afebrile, monitor clinically    Heme  -SCD's  -defer pharmacologic DVT ppx for 24h post op  -cont ASA and plavix    GI  -regular diet    Renal  -goal normonatremia    Access:  MAURA carrion

## 2019-10-12 NOTE — PROGRESS NOTE ADULT - SUBJECTIVE AND OBJECTIVE BOX
HPI:  79 year old female with PMH of HLD, CAD s/p cardiac stents, h/o ACOMM artery aneurysm s/p coiling x2 presents today for possible stent and coil of ACOMM artery aneurysm. Patient has been on Aspirin 81mg and Plavix 75mg daily in anticipation of this procedure and has taken a full dose Aspirin this morning for repeat accumetrics. Her aneurysm was originally discovered for work-up for headaches and visual changes. She reports intermittent mild headaches but otherwise no new neurological symptoms. (11 Oct 2019 10:52)        Hospital Course:   POD#0: S/P femoral cerebral angiogram ACOMM artery stent placement. Pt seen and examined at bedside in PACU. Pt reports right eye irritation and sore throat. Pt denies acute weakness/loss of sensation of extremities. Pt found to have a small right groin hematoma upon examination of right groin. Pressure applied at groin site for 15minutes.   POD#1: KIERRA overnight. Vascular service following. Right groin hematoma stable. Plan for RLE arterial US today, then discharge to home.       Vital Signs Last 24 Hrs  T(C): 36.8 (12 Oct 2019 10:07), Max: 37.2 (12 Oct 2019 00:23)  T(F): 98.2 (12 Oct 2019 10:07), Max: 98.9 (12 Oct 2019 00:23)  HR: 78 (12 Oct 2019 14:00) (72 - 100)  BP: 127/73 (12 Oct 2019 14:00) (102/86 - 133/72)  BP(mean): 99 (12 Oct 2019 14:00) (76 - 108)  RR: 16 (12 Oct 2019 14:00) (14 - 21)  SpO2: 96% (12 Oct 2019 14:00) (93% - 99%)    I&O's Detail    11 Oct 2019 07:01  -  12 Oct 2019 07:00  --------------------------------------------------------  IN:    Oral Fluid: 100 mL    Other: 800 mL    sodium chloride 0.9%: 1500 mL  Total IN: 2400 mL    OUT:    Indwelling Catheter - Urethral: 1775 mL  Total OUT: 1775 mL    Total NET: 625 mL      12 Oct 2019 07:01  -  12 Oct 2019 14:17  --------------------------------------------------------  IN:    IV PiggyBack: 180 mL    Oral Fluid: 240 mL    sodium chloride 0.9%: 600 mL  Total IN: 1020 mL    OUT:    Indwelling Catheter - Urethral: 315 mL  Total OUT: 315 mL    Total NET: 705 mL        I&O's Summary    11 Oct 2019 07:01  -  12 Oct 2019 07:00  --------------------------------------------------------  IN: 2400 mL / OUT: 1775 mL / NET: 625 mL    12 Oct 2019 07:01  -  12 Oct 2019 14:17  --------------------------------------------------------  IN: 1020 mL / OUT: 315 mL / NET: 705 mL        PHYSICAL EXAM:  Neurological:  AAOx3, NAD, coherent speech, following commands  CNII-xII grossly intact  MAEx4, strength 5/5 UE and LE b/l, no drift  SILT throughout   Groin: right groin with stable ecchymosis and hematoma. pulses intact, extremities warm and well perfused       TUBES/LINES:  [] CVC  [] A-line  [] Lumbar Drain  [] Ventriculostomy  [] GALLO  [] Mayer  [] NGT   [] Other    DIET:  [] NPO  [x] Mechanical  [] Tube feeds    LABS:                        11.0   7.11  )-----------( 158      ( 12 Oct 2019 06:37 )             34.0     10-12    140  |  108  |  9   ----------------------------<  86  3.2<L>   |  23  |  0.45<L>    Ca    8.6      12 Oct 2019 06:37  Phos  2.2     10-12  Mg     1.8     10-12      PT/INR - ( 11 Oct 2019 18:59 )   PT: 12.6 sec;   INR: 1.11          PTT - ( 11 Oct 2019 18:59 )  PTT:98.2 sec        CAPILLARY BLOOD GLUCOSE          Drug Levels: [] N/A    CSF Analysis: [] N/A      Allergies    No Known Allergies    Intolerances        Home Medications:  aspirin 81 mg oral tablet, chewable: 1 tab(s) orally once a day (11 Oct 2019 12:04)  Plavix 75 mg oral tablet: 1 tab(s) orally once a day (11 Oct 2019 10:55)  rosuvastatin 10 mg oral tablet: 1 tab(s) orally once a day (11 Oct 2019 10:55)      MEDICATIONS:  Antibiotics:    Neuro:  acetaminophen   Tablet .. 650 milliGRAM(s) Oral every 6 hours PRN  aspirin 325 milliGRAM(s) Oral daily  ondansetron Injectable 4 milliGRAM(s) IV Push every 6 hours PRN    Anticoagulation:  clopidogrel Tablet 75 milliGRAM(s) Oral daily    OTHER:  artificial  tears Solution 1 Drop(s) Both EYES two times a day  atorvastatin 40 milliGRAM(s) Oral daily  benzocaine 15 mG/menthol 3.6 mG Lozenge 1 Lozenge Oral two times a day PRN  chlorhexidine 4% Liquid 1 Application(s) Topical once  docusate sodium 100 milliGRAM(s) Oral two times a day  erythromycin   Ointment 1 Application(s) Both EYES two times a day  senna 2 Tablet(s) Oral at bedtime    IVF:    CULTURES:    RADIOLOGY & ADDITIONAL TESTS:      ASSESSMENT:  79yFemale with PMH of HLD, CAD s/p cardiac stents, h/o ACOMM artery aneurysm s/p coiling x2, now s/p femoral cerebral angiogram ACOMM artery stent placement, POD#1    CEREBRAL ANEUYRSM I67.1  No pertinent family history in first degree relatives  Handoff  Cerebral aneurysm  CAD (coronary artery disease)  HLD (hyperlipidemia)  Anterior communicating artery aneurysm  Anterior communicating artery aneurysm  Cerebral aneurysm  CAD (coronary artery disease)  HLD (hyperlipidemia)  Angiogram, cerebral, with stent insertion  S/P cardiac catheterization      PLAN:  NEURO:  -neuro checks/ vital checks  -pulse checks/ groin checks  -vascular following- right groin hematoma stable  -f/u RLE arterial US today  -continue ASA and Plavix    CARDIOVASCULAR:  -SBP goal normotensive     PULMONARY:  -room air, satting well     RENAL:  -mayer removed, voiding     GI:  -regular diet  -bowel regimen    HEME:  -h/h stable     ID:  -afebrile     ENDO:  -stable     DVT PROPHYLAXIS:  [x] Venodynes                                [x] Heparin/Lovenox    FALL RISK:  [] Low Risk                                    [] Impulsive    DISPOSITION:   -ICU status   -Full code  -PT/OT pending   -Discussed with Dr. Lowe and Dr. Guzmán     Assessment:  Present when checked    []  GCS  E   V  M     Heart Failure: []Acute, [] acute on chronic , []chronic  Heart Failure:  [] Diastolic (HFpEF), [] Systolic (HFrEF), []Combined (HFpEF and HFrEF), [] RHF, [] Pulm HTN, [] Other    [] SEBASTI[place]N, [] ATN, [] AIN, [] other  [] CKD1, [] CKD2, [] CKD 3, [] CKD 4, [] CKD 5, []ESRD    Encephalopathy: [] Metabolic, [] Hepatic, [] toxic, [] Neurological, [] Other    Abnormal Nurtitional Status: [] malnurtition (see nutrition note), [ ]underweight: BMI < 19, [] morbid obesity: BMI >40, [] Cachexia    [] Sepsis  [] hypovolemic shock,[] cardiogenic shock, [] hemorrhagic shock, [] neuogenic shock  [] Acute Respiratory Failure  []Cerebral edema, [] Brain compression/ herniation,   [] Functional quadriplegia  [] Acute blood loss anemia

## 2019-10-12 NOTE — DISCHARGE NOTE PROVIDER - NSDCFUSCHEDAPPT_GEN_ALL_CORE_FT
CHILO GUERRERO ; 11/14/2019 ; [facility] [address] [place] [address] CHILO GUERRERO ; 11/14/2019 ; [facility] [address] [place] [address]

## 2019-10-12 NOTE — PROVIDER CONTACT NOTE (OTHER) - ASSESSMENT
V/S stable. Pt said she had this at home in the past and went to doctor for it. Pt appeared anxious and was c/o epigastric and abdominal discomfort, been wanting to have BM.

## 2019-10-12 NOTE — DISCHARGE NOTE PROVIDER - HOSPITAL COURSE
79 year old female with PMH of HLD, CAD s/p cardiac stents, h/o ACOMM artery aneurysm s/p coiling x2 presents today for possible stent and coil of ACOMM artery aneurysm. Patient has been on Aspirin 81mg and Plavix 75mg daily in anticipation of this procedure and has taken a full dose Aspirin this morning for repeat accumetrics. Her aneurysm was originally discovered for work-up for headaches and visual changes. She reports intermittent mild headaches but otherwise no new neurological symptoms.        Hospital Course:     POD#0: S/P femoral cerebral angiogram ACOMM artery stent placement. Pt seen and examined at bedside in PACU. Pt reports right eye irritation and sore throat. Pt denies acute weakness/loss of sensation of extremities. Pt found to have a small right groin hematoma upon examination of right groin. Pressure applied at groin site for 15minutes.     POD#1: KIERRA overnight. Vascular service following. Right groin hematoma stable. RLE arterial ultrasound negative for pseudoaneurysm or other abnormality.         Patient medically optimized for discharge home.

## 2019-10-12 NOTE — DISCHARGE NOTE PROVIDER - CARE PROVIDER_API CALL
Miguel Lowe)  Neurological Surgery  [address] [place] [address]  Phone: [phone]  Fax: [phone]  Follow Up Time:     Matt Antonio)  [facility]  Vascular  [address] [place] [address]  Phone: [phone]  Fax: [phone]  Follow Up Time:

## 2019-10-12 NOTE — DISCHARGE NOTE NURSING/CASE MANAGEMENT/SOCIAL WORK - PATIENT PORTAL LINK FT
You can access the FollowMyHealth Patient Portal offered by [facility] by registering at the following website: [email or web address]. By joining [employer]’s FollowMyHealth portal, you will also be able to view your health information using other applications (apps) compatible with our system.

## 2019-10-12 NOTE — PROVIDER CONTACT NOTE (OTHER) - SITUATION
Pt c/o intermittent chest pain, and palpitation, she said she first experienced at 11PM and then it went away, and now she felt it again but it went away now.

## 2019-10-12 NOTE — DISCHARGE NOTE PROVIDER - NSDCFUADDAPPT_GEN_ALL_CORE_FT
Inform the doctor immediately if you have a fever (above 101), chills, night  sweats, wound drainage, increasing wound redness or pain, nausea, vomiting, or  worsening headache.  Monitor right groin incision/hematoma  Continue taking Aspirin and Plavix  Call Dr. Lowe's office to schedule a date for repeat cerebral angiogram: [phone] Inform the doctor immediately if you have a fever (above 101), chills, night  sweats, wound drainage, increasing wound redness or pain, nausea, vomiting, or  worsening headache.  Monitor right groin incision/hematoma  Continue taking Aspirin 325 and Plavix 75 daily  Call Dr. Lowe's office to schedule a date for repeat cerebral angiogram: [phone]

## 2019-10-12 NOTE — DISCHARGE NOTE PROVIDER - CARE PROVIDERS DIRECT ADDRESSES
[email or web address]

## 2019-10-12 NOTE — DISCHARGE NOTE NURSING/CASE MANAGEMENT/SOCIAL WORK - NSDCFUADDAPPT_GEN_ALL_CORE_FT
Inform the doctor immediately if you have a fever (above 101), chills, night  sweats, wound drainage, increasing wound redness or pain, nausea, vomiting, or  worsening headache.  Monitor right groin incision/hematoma  Continue taking Aspirin 325 and Plavix 75 daily  Call Dr. Lowe's office to schedule a date for repeat cerebral angiogram: [phone]

## 2019-10-12 NOTE — PROGRESS NOTE ADULT - SUBJECTIVE AND OBJECTIVE BOX
79/F with hx of CAD s/p PCI, Acomm aneurysm s/p 2 prior coiling now here for re-occlusion of aneurysm via stenting.  Was on ASA and plavix prior to procedure.      Post-op with groin hematoma, stabilized with local pressure.    Operative note: Femoral cerebral angiogram performed under general anesthesia using a 6Fr long sheath via right CFA. Left ICA injection with 3D spin performed demonstrates previously coiled ACOMM artery aneurysm. [place] stent 3x24mm deployed covering the ACOMM artery. Patient was Heparinized for the procedure and stopped at the end of the procedure, ACT at the end was 301. Patient successfully extubated and remains neurologically and hemodynamically stable.    PMH:   aneurysm per above  CAD  HLD  CAD s/p cath (stents?    24h events: no events overnight.  groin hematoma appers grossly stable but recommended dopper per vascular surgery.     EXAM:  Gen: elderly woman in NAD  CV: RRR  Abd: soft NTND  Skin: two vascular malformations vs telengeictasias vs other dermatologic abnormality noted on left side of tongue and left anterior shoulder    MSE: awake, alert, oriented x3, follows multistep commands, attention normal, language fluent with intact naming  CN: PHILLY, EOMI, VFF, face symmetric, TUP midline, no dysarthria  Motor: no pronator drift, full strength to confrontation in all extremities  Sensory: intact to LT throughout       Allergies: No Known Allergies          VITALS:  T(C): 36.8 (10-12-19 @ 10:07), Max: 37.2 (10-12-19 @ 00:23)  HR: 76 (10-12-19 @ 12:00) (72 - 100)  BP: 119/61 (10-12-19 @ 12:00) (102/86 - 129/74)  RR: 16 (10-12-19 @ 11:00) (14 - 21)  SpO2: 93% (10-12-19 @ 12:00) (93% - 99%)    EXAM:        MEDICATIONS:  acetaminophen   Tablet .. 650 milliGRAM(s) Oral every 6 hours PRN  artificial  tears Solution 1 Drop(s) Both EYES two times a day  aspirin 325 milliGRAM(s) Oral daily  atorvastatin 40 milliGRAM(s) Oral daily  benzocaine 15 mG/menthol 3.6 mG Lozenge 1 Lozenge Oral two times a day PRN  chlorhexidine 4% Liquid 1 Application(s) Topical once  clopidogrel Tablet 75 milliGRAM(s) Oral daily  docusate sodium 100 milliGRAM(s) Oral two times a day  erythromycin   Ointment 1 Application(s) Both EYES two times a day  ondansetron Injectable 4 milliGRAM(s) IV Push every 6 hours PRN  potassium chloride  10 mEq/100 mL IVPB 10 milliEquivalent(s) IV Intermittent every 1 hour  potassium phosphate IVPB 30 milliMole(s) IV Intermittent once  senna 2 Tablet(s) Oral at bedtime  sodium chloride 0.9%. 1000 milliLiter(s) IV Continuous <Continuous>      I/O's    10-11-19 @ 07:01  -  10-12-19 @ 07:00  --------------------------------------------------------  IN: 2400 mL / OUT: 1775 mL / NET: 625 mL    10-12-19 @ 07:01  -  10-12-19 @ 13:28  --------------------------------------------------------  IN: 1020 mL / OUT: 315 mL / NET: 705 mL          LABS:                          11.0   7.11  )-----------( 158      ( 12 Oct 2019 06:37 )             34.0     10-12    140  |  108  |  9   ----------------------------<  86  3.2<L>   |  23  |  0.45<L>    Ca    8.6      12 Oct 2019 06:37  Phos  2.2     10-12  Mg     1.8     10-12      PT/INR - ( 11 Oct 2019 18:59 )   PT: 12.6 sec;   INR: 1.11          PTT - ( 11 Oct 2019 18:59 )  PTT:98.2 sec        CAPILLARY BLOOD GLUCOSE

## 2019-10-14 PROBLEM — E78.5 HYPERLIPIDEMIA, UNSPECIFIED: Chronic | Status: ACTIVE | Noted: 2019-10-11

## 2019-10-14 PROBLEM — I25.10 ATHEROSCLEROTIC HEART DISEASE OF NATIVE CORONARY ARTERY WITHOUT ANGINA PECTORIS: Chronic | Status: ACTIVE | Noted: 2019-10-11

## 2019-10-14 PROBLEM — I67.1 CEREBRAL ANEURYSM, NONRUPTURED: Chronic | Status: ACTIVE | Noted: 2019-10-11

## 2019-10-17 ENCOUNTER — APPOINTMENT (OUTPATIENT)
Dept: NEUROSURGERY | Facility: CLINIC | Age: 79
End: 2019-10-17
Payer: MEDICARE

## 2019-10-17 ENCOUNTER — LABORATORY RESULT (OUTPATIENT)
Age: 79
End: 2019-10-17

## 2019-10-17 VITALS
HEIGHT: 62 IN | RESPIRATION RATE: 18 BRPM | HEART RATE: 84 BPM | WEIGHT: 145 LBS | SYSTOLIC BLOOD PRESSURE: 147 MMHG | OXYGEN SATURATION: 94 % | BODY MASS INDEX: 26.68 KG/M2 | DIASTOLIC BLOOD PRESSURE: 81 MMHG

## 2019-10-17 DIAGNOSIS — Z98.890 OTHER SPECIFIED POSTPROCEDURAL STATES: ICD-10-CM

## 2019-10-17 DIAGNOSIS — I67.1 CEREBRAL ANEURYSM, NONRUPTURED: ICD-10-CM

## 2019-10-17 PROCEDURE — 99213 OFFICE O/P EST LOW 20 MIN: CPT

## 2019-10-22 PROBLEM — Z98.890 S/P COIL EMBOLIZATION OF CEREBRAL ANEURYSM: Status: ACTIVE | Noted: 2019-08-29

## 2019-10-22 PROBLEM — I67.1 UNRUPTURED CEREBRAL ANEURYSM: Status: ACTIVE | Noted: 2019-08-29

## 2019-10-29 NOTE — ASSESSMENT
[FreeTextEntry1] : Endovascular Procedure for COIL PLACEMENT scheduled\par Need Brief medical clearance update.\par Education provided regarding plan of care.\par

## 2019-10-29 NOTE — REASON FOR VISIT
[Follow-Up: _____] : a [unfilled] follow-up visit [FreeTextEntry1] : S/P Endovascular Embolization  and placement of Stent of cerebral aneurysm on 10/11/19.\par She reports that She is doing okay. She is discouraged by the ecchymotic areas over there Left breast and Right Groin Ecchymosis. NO HEMATOMA  at either site. +2 Pulses bilaterally.\par She is doing well with no evidence of stroke syndrome.

## 2019-10-29 NOTE — ADDENDUM
[FreeTextEntry1] : PATIENT:	Afia Quigley 				\par 	\par \par Thursday, October 17, 2019\par \par I saw Afia in followup today.  I explained to her that we deferred the coil embolization of her aneurysm after having some difficulty with placing her intraarterial stent.  The stent is in excellent position, but due to some stent movement, I was reluctant to cross the stent with a microcatheter at that time.  I deferred the aneurysm treatment until the stent matured and told Afia so.  She does remain on aspirin and Plavix.  She does have some bruising over her chest and breast as well as her groin, which is a little bit more than I would expect, but I do think that some people have this sort of reaction to these medications.  She is somewhat disappointed that we were unable to coil the aneurysm at the same time as we placed the stent, but I did explain to her that I thought this was in her best interest, and she appears to be very comfortable with our plan.  We are planning on bringing her back in mid-November for the stenting itself.\par \par \par \par Miguel Lowe MD\par \par DL/ag [facility] #[id number]\par \par \par \par

## 2019-10-29 NOTE — PHYSICAL EXAM
[General Appearance - In No Acute Distress] : in no acute distress [General Appearance - Alert] : alert [I: Normal Smell] : smell intact bilaterally [Oriented To Time, Place, And Person] : oriented to person, place, and time [Impaired Insight] : insight and judgment were intact [Cranial Nerves Oculomotor (III)] : extraocular motion intact [Cranial Nerves Optic (II)] : visual acuity intact bilaterally,  pupils equal round and reactive to light [Cranial Nerves Trigeminal (V)] : facial sensation intact symmetrically [Cranial Nerves Glossopharyngeal (IX)] : tongue and palate midline [Cranial Nerves Facial (VII)] : face symmetrical [Cranial Nerves Accessory (XI - Cranial And Spinal)] : head turning and shoulder shrug symmetric [Cranial Nerves Vestibulocochlear (VIII)] : hearing was intact bilaterally [Motor Tone] : muscle tone was normal in all four extremities [Motor Strength] : muscle strength was normal in all four extremities [Cranial Nerves Hypoglossal (XII)] : there was no tongue deviation with protrusion [Sensation Tactile Decrease] : light touch was intact [Balance] : balance was intact [Sclera] : the sclera and conjunctiva were normal [Neck Appearance] : the appearance of the neck was normal [Outer Ear] : the ears and nose were normal in appearance [Abnormal Walk] : normal gait [Heart Rate And Rhythm] : heart rate was normal and rhythm regular [Abdomen Soft] : soft [FreeTextEntry1] : RIGHT GROIN POST Endovascular Stent

## 2019-10-29 NOTE — REVIEW OF SYSTEMS
[Easy Bleeding] : a tendency for easy bleeding [Easy Bruising] : a tendency for easy bruising [Negative] : Endocrine [Swollen Glands] : no swollen glands [Swollen Glands In The Neck] : no swollen glands in the neck [de-identified] : Right Breast ecchymotic area and Right Groin. No evidence of circulation compromise

## 2019-11-12 PROCEDURE — C1769: CPT

## 2019-11-12 PROCEDURE — 85730 THROMBOPLASTIN TIME PARTIAL: CPT

## 2019-11-12 PROCEDURE — 83735 ASSAY OF MAGNESIUM: CPT

## 2019-11-12 PROCEDURE — C1887: CPT

## 2019-11-12 PROCEDURE — 93926 LOWER EXTREMITY STUDY: CPT

## 2019-11-12 PROCEDURE — 84100 ASSAY OF PHOSPHORUS: CPT

## 2019-11-12 PROCEDURE — C1877: CPT

## 2019-11-12 PROCEDURE — 85576 BLOOD PLATELET AGGREGATION: CPT

## 2019-11-12 PROCEDURE — 80048 BASIC METABOLIC PNL TOTAL CA: CPT

## 2019-11-12 PROCEDURE — 36415 COLL VENOUS BLD VENIPUNCTURE: CPT

## 2019-11-12 PROCEDURE — C1757: CPT

## 2019-11-12 PROCEDURE — 85027 COMPLETE CBC AUTOMATED: CPT

## 2019-11-12 PROCEDURE — C1760: CPT

## 2019-11-12 PROCEDURE — 85610 PROTHROMBIN TIME: CPT

## 2019-11-12 PROCEDURE — C1894: CPT

## 2019-11-14 ENCOUNTER — APPOINTMENT (OUTPATIENT)
Dept: NEUROSURGERY | Facility: CLINIC | Age: 79
End: 2019-11-14

## 2019-11-18 ENCOUNTER — TRANSCRIPTION ENCOUNTER (OUTPATIENT)
Age: 79
End: 2019-11-18

## 2019-11-18 ENCOUNTER — INPATIENT (INPATIENT)
Facility: HOSPITAL | Age: 79
LOS: 1 days | Discharge: HOME | DRG: 27 | End: 2019-11-20
Attending: NEUROLOGICAL SURGERY | Admitting: NEUROLOGICAL SURGERY
Payer: MEDICARE

## 2019-11-18 VITALS
DIASTOLIC BLOOD PRESSURE: 45 MMHG | SYSTOLIC BLOOD PRESSURE: 99 MMHG | HEART RATE: 68 BPM | RESPIRATION RATE: 18 BRPM | OXYGEN SATURATION: 96 % | TEMPERATURE: 97 F
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
## 2019-11-18 DIAGNOSIS — Z79.82 LONG TERM (CURRENT) USE OF ASPIRIN: ICD-10-CM

## 2019-11-18 DIAGNOSIS — I67.1 CEREBRAL ANEURYSM, NONRUPTURED: ICD-10-CM
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
## 2019-11-18 DIAGNOSIS — I25.10 ATHEROSCLEROTIC HEART DISEASE OF NATIVE CORONARY ARTERY WITHOUT ANGINA PECTORIS: ICD-10-CM

## 2019-11-18 DIAGNOSIS — Z95.5 PRESENCE OF CORONARY ANGIOPLASTY IMPLANT AND GRAFT: ICD-10-CM

## 2019-11-18 DIAGNOSIS — H91.8X1 OTHER SPECIFIED HEARING LOSS, RIGHT EAR: ICD-10-CM

## 2019-11-18 DIAGNOSIS — Z79.02 LONG TERM (CURRENT) USE OF ANTITHROMBOTICS/ANTIPLATELETS: ICD-10-CM

## 2019-11-18 DIAGNOSIS — R41.0 DISORIENTATION, UNSPECIFIED: ICD-10-CM

## 2019-11-18 DIAGNOSIS — S05.01XA INJURY OF CONJUNCTIVA AND CORNEAL ABRASION WITHOUT FOREIGN BODY, RIGHT EYE, INITIAL ENCOUNTER: ICD-10-CM

## 2019-11-18 DIAGNOSIS — E78.5 HYPERLIPIDEMIA, UNSPECIFIED: ICD-10-CM

## 2019-11-18 DIAGNOSIS — Z98.890 OTHER SPECIFIED POSTPROCEDURAL STATES: Chronic | ICD-10-CM

## 2019-11-18 LAB
ANION GAP SERPL CALC-SCNC: 10 MMOL/L — SIGNIFICANT CHANGE UP (ref 5–17)
APTT BLD: >200 SEC — CRITICAL HIGH (ref 27.5–36.3)
BUN SERPL-MCNC: 11 MG/DL — SIGNIFICANT CHANGE UP (ref 7–23)
CALCIUM SERPL-MCNC: 8 MG/DL — LOW (ref 8.4–10.5)
CHLORIDE SERPL-SCNC: 108 MMOL/L — SIGNIFICANT CHANGE UP (ref 96–108)
CO2 SERPL-SCNC: 24 MMOL/L — SIGNIFICANT CHANGE UP (ref 22–31)
CREAT SERPL-MCNC: 0.41 MG/DL — LOW (ref 0.5–1.3)
GLUCOSE BLDC GLUCOMTR-MCNC: 111 MG/DL — HIGH (ref 70–99)
GLUCOSE BLDC GLUCOMTR-MCNC: 120 MG/DL — HIGH (ref 70–99)
GLUCOSE BLDC GLUCOMTR-MCNC: 122 MG/DL — HIGH (ref 70–99)
GLUCOSE BLDC GLUCOMTR-MCNC: 153 MG/DL — HIGH (ref 70–99)
GLUCOSE SERPL-MCNC: 125 MG/DL — HIGH (ref 70–99)
HCT VFR BLD CALC: 33.9 % — LOW (ref 34.5–45)
HGB BLD-MCNC: 11.1 G/DL — LOW (ref 11.5–15.5)
INR BLD: 1.09 — SIGNIFICANT CHANGE UP (ref 0.88–1.16)
MCHC RBC-ENTMCNC: 31 PG — SIGNIFICANT CHANGE UP (ref 27–34)
MCHC RBC-ENTMCNC: 32.7 GM/DL — SIGNIFICANT CHANGE UP (ref 32–36)
MCV RBC AUTO: 94.7 FL — SIGNIFICANT CHANGE UP (ref 80–100)
NRBC # BLD: 0 /100 WBCS — SIGNIFICANT CHANGE UP (ref 0–0)
PA ADP PRP-ACNC: 198 PRU — SIGNIFICANT CHANGE UP (ref 194–418)
PLATELET # BLD AUTO: 186 K/UL — SIGNIFICANT CHANGE UP (ref 150–400)
PLATELET RESPONSE ASPIRIN RESULT: 551 ARU — SIGNIFICANT CHANGE UP (ref 350–700)
POTASSIUM SERPL-MCNC: 3.3 MMOL/L — LOW (ref 3.5–5.3)
POTASSIUM SERPL-SCNC: 3.3 MMOL/L — LOW (ref 3.5–5.3)
PROTHROM AB SERPL-ACNC: 12.3 SEC — SIGNIFICANT CHANGE UP (ref 10–12.9)
RBC # BLD: 3.58 M/UL — LOW (ref 3.8–5.2)
RBC # FLD: 13 % — SIGNIFICANT CHANGE UP (ref 10.3–14.5)
SODIUM SERPL-SCNC: 142 MMOL/L — SIGNIFICANT CHANGE UP (ref 135–145)
WBC # BLD: 6.75 K/UL — SIGNIFICANT CHANGE UP (ref 3.8–10.5)
WBC # FLD AUTO: 6.75 K/UL — SIGNIFICANT CHANGE UP (ref 3.8–10.5)

## 2019-11-18 PROCEDURE — 99221 1ST HOSP IP/OBS SF/LOW 40: CPT

## 2019-11-18 PROCEDURE — 36217 PLACE CATHETER IN ARTERY: CPT

## 2019-11-18 PROCEDURE — 70450 CT HEAD/BRAIN W/O DYE: CPT | Mod: 26

## 2019-11-18 PROCEDURE — 70450 CT HEAD/BRAIN W/O DYE: CPT | Mod: 26,77

## 2019-11-18 PROCEDURE — 99291 CRITICAL CARE FIRST HOUR: CPT

## 2019-11-18 PROCEDURE — 61624 TCAT PERM OCCLS/EMBOLJ CNS: CPT

## 2019-11-18 PROCEDURE — 75898 FOLLOW-UP ANGIOGRAPHY: CPT | Mod: 26

## 2019-11-18 PROCEDURE — 75894 X-RAYS TRANSCATH THERAPY: CPT | Mod: 26

## 2019-11-18 RX ORDER — PROPARACAINE HYDROCHLORIDE 5 MG/ML
1 SOLUTION/ DROPS OPHTHALMIC ONCE
Refills: 0 | Status: COMPLETED | OUTPATIENT
Start: 2019-11-18 | End: 2019-11-18

## 2019-11-18 RX ORDER — CLOPIDOGREL BISULFATE 75 MG/1
1 TABLET, FILM COATED ORAL
Qty: 30 | Refills: 0
Start: 2019-11-18 | End: 2019-12-17

## 2019-11-18 RX ORDER — GLUCAGON INJECTION, SOLUTION 0.5 MG/.1ML
1 INJECTION, SOLUTION SUBCUTANEOUS ONCE
Refills: 0 | Status: DISCONTINUED | OUTPATIENT
Start: 2019-11-18 | End: 2019-11-20

## 2019-11-18 RX ORDER — OFLOXACIN 0.3 %
1 DROPS OPHTHALMIC (EYE)
Refills: 0 | Status: DISCONTINUED | OUTPATIENT
Start: 2019-11-18 | End: 2019-11-20

## 2019-11-18 RX ORDER — FENTANYL CITRATE 50 UG/ML
12.5 INJECTION INTRAVENOUS
Refills: 0 | Status: DISCONTINUED | OUTPATIENT
Start: 2019-11-18 | End: 2019-11-19

## 2019-11-18 RX ORDER — CHLORHEXIDINE GLUCONATE 213 G/1000ML
1 SOLUTION TOPICAL ONCE
Refills: 0 | Status: DISCONTINUED | OUTPATIENT
Start: 2019-11-18 | End: 2019-11-20

## 2019-11-18 RX ORDER — ASPIRIN/CALCIUM CARB/MAGNESIUM 324 MG
325 TABLET ORAL ONCE
Refills: 0 | Status: DISCONTINUED | OUTPATIENT
Start: 2019-11-18 | End: 2019-11-18

## 2019-11-18 RX ORDER — KETOROLAC TROMETHAMINE 0.5 %
1 DROPS OPHTHALMIC (EYE) EVERY 6 HOURS
Refills: 0 | Status: DISCONTINUED | OUTPATIENT
Start: 2019-11-18 | End: 2019-11-18

## 2019-11-18 RX ORDER — SODIUM CHLORIDE 9 MG/ML
1000 INJECTION, SOLUTION INTRAVENOUS
Refills: 0 | Status: DISCONTINUED | OUTPATIENT
Start: 2019-11-18 | End: 2019-11-20

## 2019-11-18 RX ORDER — ONDANSETRON 8 MG/1
4 TABLET, FILM COATED ORAL EVERY 8 HOURS
Refills: 0 | Status: DISCONTINUED | OUTPATIENT
Start: 2019-11-18 | End: 2019-11-20

## 2019-11-18 RX ORDER — ACETAMINOPHEN 500 MG
650 TABLET ORAL EVERY 6 HOURS
Refills: 0 | Status: DISCONTINUED | OUTPATIENT
Start: 2019-11-18 | End: 2019-11-18

## 2019-11-18 RX ORDER — SODIUM CHLORIDE 9 MG/ML
1000 INJECTION INTRAMUSCULAR; INTRAVENOUS; SUBCUTANEOUS
Refills: 0 | Status: DISCONTINUED | OUTPATIENT
Start: 2019-11-18 | End: 2019-11-18

## 2019-11-18 RX ORDER — ASPIRIN/CALCIUM CARB/MAGNESIUM 324 MG
325 TABLET ORAL DAILY
Refills: 0 | Status: DISCONTINUED | OUTPATIENT
Start: 2019-11-18 | End: 2019-11-20

## 2019-11-18 RX ORDER — CLOPIDOGREL BISULFATE 75 MG/1
75 TABLET, FILM COATED ORAL DAILY
Refills: 0 | Status: DISCONTINUED | OUTPATIENT
Start: 2019-11-18 | End: 2019-11-20

## 2019-11-18 RX ORDER — ASPIRIN/CALCIUM CARB/MAGNESIUM 324 MG
81 TABLET ORAL ONCE
Refills: 0 | Status: DISCONTINUED | OUTPATIENT
Start: 2019-11-18 | End: 2019-11-18

## 2019-11-18 RX ORDER — TOBRAMYCIN 0.3 %
1 DROPS OPHTHALMIC (EYE) EVERY 6 HOURS
Refills: 0 | Status: DISCONTINUED | OUTPATIENT
Start: 2019-11-18 | End: 2019-11-18

## 2019-11-18 RX ORDER — DEXTROSE 50 % IN WATER 50 %
15 SYRINGE (ML) INTRAVENOUS ONCE
Refills: 0 | Status: DISCONTINUED | OUTPATIENT
Start: 2019-11-18 | End: 2019-11-19

## 2019-11-18 RX ORDER — ASPIRIN/CALCIUM CARB/MAGNESIUM 324 MG
325 TABLET ORAL ONCE
Refills: 0 | Status: COMPLETED | OUTPATIENT
Start: 2019-11-18 | End: 2019-11-18

## 2019-11-18 RX ORDER — CLOPIDOGREL BISULFATE 75 MG/1
75 TABLET, FILM COATED ORAL ONCE
Refills: 0 | Status: COMPLETED | OUTPATIENT
Start: 2019-11-18 | End: 2019-11-18

## 2019-11-18 RX ORDER — POTASSIUM CHLORIDE 20 MEQ
10 PACKET (EA) ORAL
Refills: 0 | Status: COMPLETED | OUTPATIENT
Start: 2019-11-18 | End: 2019-11-18

## 2019-11-18 RX ORDER — SENNA PLUS 8.6 MG/1
2 TABLET ORAL AT BEDTIME
Refills: 0 | Status: DISCONTINUED | OUTPATIENT
Start: 2019-11-18 | End: 2019-11-20

## 2019-11-18 RX ORDER — PANTOPRAZOLE SODIUM 20 MG/1
40 TABLET, DELAYED RELEASE ORAL
Refills: 0 | Status: DISCONTINUED | OUTPATIENT
Start: 2019-11-18 | End: 2019-11-20

## 2019-11-18 RX ORDER — TRAMADOL HYDROCHLORIDE 50 MG/1
50 TABLET ORAL EVERY 6 HOURS
Refills: 0 | Status: DISCONTINUED | OUTPATIENT
Start: 2019-11-18 | End: 2019-11-20

## 2019-11-18 RX ORDER — INSULIN LISPRO 100/ML
VIAL (ML) SUBCUTANEOUS EVERY 6 HOURS
Refills: 0 | Status: DISCONTINUED | OUTPATIENT
Start: 2019-11-18 | End: 2019-11-19

## 2019-11-18 RX ORDER — SODIUM CHLORIDE 9 MG/ML
1000 INJECTION INTRAMUSCULAR; INTRAVENOUS; SUBCUTANEOUS
Refills: 0 | Status: DISCONTINUED | OUTPATIENT
Start: 2019-11-18 | End: 2019-11-19

## 2019-11-18 RX ORDER — ACETAMINOPHEN 500 MG
650 TABLET ORAL EVERY 6 HOURS
Refills: 0 | Status: DISCONTINUED | OUTPATIENT
Start: 2019-11-18 | End: 2019-11-20

## 2019-11-18 RX ORDER — CLOPIDOGREL BISULFATE 75 MG/1
1 TABLET, FILM COATED ORAL
Qty: 0 | Refills: 0 | DISCHARGE

## 2019-11-18 RX ORDER — DEXTROSE 50 % IN WATER 50 %
12.5 SYRINGE (ML) INTRAVENOUS ONCE
Refills: 0 | Status: DISCONTINUED | OUTPATIENT
Start: 2019-11-18 | End: 2019-11-19

## 2019-11-18 RX ADMIN — Medication 650 MILLIGRAM(S): at 22:06

## 2019-11-18 RX ADMIN — Medication 100 MILLIEQUIVALENT(S): at 22:06

## 2019-11-18 RX ADMIN — TRAMADOL HYDROCHLORIDE 50 MILLIGRAM(S): 50 TABLET ORAL at 18:56

## 2019-11-18 RX ADMIN — SODIUM CHLORIDE 100 MILLILITER(S): 9 INJECTION INTRAMUSCULAR; INTRAVENOUS; SUBCUTANEOUS at 12:19

## 2019-11-18 RX ADMIN — Medication 100 MILLIEQUIVALENT(S): at 19:51

## 2019-11-18 RX ADMIN — ONDANSETRON 4 MILLIGRAM(S): 8 TABLET, FILM COATED ORAL at 22:07

## 2019-11-18 RX ADMIN — Medication 650 MILLIGRAM(S): at 17:00

## 2019-11-18 RX ADMIN — TRAMADOL HYDROCHLORIDE 50 MILLIGRAM(S): 50 TABLET ORAL at 20:00

## 2019-11-18 RX ADMIN — PROPARACAINE HYDROCHLORIDE 1 DROP(S): 5 SOLUTION/ DROPS OPHTHALMIC at 17:56

## 2019-11-18 RX ADMIN — Medication 650 MILLIGRAM(S): at 23:08

## 2019-11-18 RX ADMIN — Medication 100 MILLIEQUIVALENT(S): at 17:56

## 2019-11-18 RX ADMIN — CLOPIDOGREL BISULFATE 75 MILLIGRAM(S): 75 TABLET, FILM COATED ORAL at 09:00

## 2019-11-18 RX ADMIN — Medication 325 MILLIGRAM(S): at 09:01

## 2019-11-18 RX ADMIN — Medication 2: at 17:56

## 2019-11-18 RX ADMIN — Medication 0: at 12:36

## 2019-11-18 RX ADMIN — Medication 1 DROP(S): at 23:59

## 2019-11-18 RX ADMIN — Medication 650 MILLIGRAM(S): at 15:58

## 2019-11-18 RX ADMIN — PANTOPRAZOLE SODIUM 40 MILLIGRAM(S): 20 TABLET, DELAYED RELEASE ORAL at 18:57

## 2019-11-18 NOTE — DISCHARGE NOTE PROVIDER - NSDCACTIVITY_GEN_ALL_CORE
Walking - Indoors allowed/Stairs allowed/No heavy lifting/straining/Showering allowed/Walking - Outdoors allowed/Do not drive or operate machinery/Do not make important decisions

## 2019-11-18 NOTE — PROGRESS NOTE ADULT - SUBJECTIVE AND OBJECTIVE BOX
79/F y/o F PMH ACOMM aneurysm coiling x2, s/p stent placement on 10/11/2019, now admitted for angio for aneurysm recanalization repair.    Pt has been on ASA 325mg and Plavix 75mg daily. Her last dose of ASA 325mg was last night.  Neurologically intact at baseline other than mild hearing loss.    Post op, with new right eye pain and some headache. Taken for CTH which was without focal findings.    PMH: above plus CAD s/p PCI, HLD      Allergies: No Known Allergies          VITALS:  T(C): 37 (11-18-19 @ 17:00), Max: 37 (11-18-19 @ 17:00)  HR: 78 (11-18-19 @ 17:00) (68 - 80)  BP: 157/81 (11-18-19 @ 17:00) (99/45 - 157/81)  RR: 14 (11-18-19 @ 15:45) (14 - 24)  SpO2: 96% (11-18-19 @ 17:00) (94% - 100%)    EXAM:  Gen: elderly woman flat after angio  CV: sinus tach  Pulm: CTA b/l  Abd: soft NTND    MSE: awake, alert, oriented x3, follows basic commands, somewhat inattentive, slow to count backwards from 20, language with some hesitation in naming and complex repeition.  CN: PHILLY, EOMI, VFF, face symmetric, TUP midline, no dysarthria  Motor: no pronator drift, full strength to confrontation in all extremities  Sensory: intact to LT throughout         MEDICATIONS:  acetaminophen   Tablet .. 650 milliGRAM(s) Oral every 6 hours PRN  aspirin 325 milliGRAM(s) Oral daily  bisacodyl 5 milliGRAM(s) Oral daily PRN  chlorhexidine 4% Liquid 1 Application(s) Topical Once  clopidogrel Tablet 75 milliGRAM(s) Oral daily  dextrose 40% Gel 15 Gram(s) Oral once PRN  dextrose 5%. 1000 milliLiter(s) IV Continuous <Continuous>  dextrose 50% Injectable 12.5 Gram(s) IV Push once  fentaNYL    Injectable 12.5 MICROGram(s) IV Push every 3 hours PRN  glucagon  Injectable 1 milliGRAM(s) IntraMuscular once PRN  insulin lispro (HumaLOG) corrective regimen sliding scale   SubCutaneous every 6 hours  ofloxacin 0.3% Solution 1 Drop(s) Right EYE four times a day  pantoprazole    Tablet 40 milliGRAM(s) Oral before breakfast  potassium chloride  10 mEq/100 mL IVPB 10 milliEquivalent(s) IV Intermittent every 1 hour  senna 2 Tablet(s) Oral at bedtime PRN  sodium chloride 0.9%. 1000 milliLiter(s) IV Continuous <Continuous>  traMADol 50 milliGRAM(s) Oral every 6 hours PRN      I/O's    11-18-19 @ 07:01  -  11-18-19 @ 18:54  --------------------------------------------------------  IN: 400 mL / OUT: 415 mL / NET: -15 mL        Ventilator data:        LABS:                          11.1   6.75  )-----------( 186      ( 18 Nov 2019 12:51 )             33.9     11-18    142  |  108  |  11  ----------------------------<  125<H>  3.3<L>   |  24  |  0.41<L>    Ca    8.0<L>      18 Nov 2019 12:51      PT/INR - ( 18 Nov 2019 12:51 )   PT: 12.3 sec;   INR: 1.09          PTT - ( 18 Nov 2019 12:51 )  PTT:>200.0 sec        CAPILLARY BLOOD GLUCOSE      POCT Blood Glucose.: 153 mg/dL (18 Nov 2019 17:26)  POCT Blood Glucose.: 111 mg/dL (18 Nov 2019 12:12) 79/F y/o F PMH ACOMM aneurysm coiling x2, s/p stent placement on 10/11/2019, now admitted for angio for aneurysm recanalization repair.    Pt has been on ASA 325mg and Plavix 75mg daily. Her last dose of ASA 325mg was last night.  Neurologically intact at baseline other than mild hearing loss.    Post op, with new right eye pain and some headache. Taken for CTH which was without focal findings.    PMH: above plus CAD s/p PCI, HLD      Allergies: No Known Allergies      24h events: patient with notable word-finding difficulty post-op continued to this morning.  urgent MRI negative for stroke, no vessel imaging performed.              Allergies: No Known Allergies        EXAM:  Gen: elderly woman flat after angio  CV: sinus tach  Pulm: CTA b/l  Abd: soft NTND    MSE: awake, alert, oriented x3, significant word-finding hesistancy though actual objct naming including low-frequency objects intact.  slow to count backwards from 20, language with some hesitation in naming and complex repeition.  CN: PHILLY, EOMI, VFF, face symmetric, TUP midline, no dysarthria  Motor: no pronator drift, full strength to confrontation in all extremities  Sensory: intact to LT throughout         VITALS:  T(C): 36.9 (11-19-19 @ 09:20), Max: 37.2 (11-18-19 @ 22:20)  HR: 78 (11-19-19 @ 11:00) (68 - 92)  BP: 110/60 (11-19-19 @ 11:00) (99/45 - 157/81)  RR: 20 (11-19-19 @ 11:00) (12 - 26)  SpO2: 97% (11-19-19 @ 11:00) (93% - 100%)    EXAM:      MEDICATIONS:  acetaminophen   Tablet .. 650 milliGRAM(s) Oral every 6 hours PRN  aspirin 325 milliGRAM(s) Oral daily  bisacodyl 5 milliGRAM(s) Oral daily PRN  chlorhexidine 4% Liquid 1 Application(s) Topical Once  clopidogrel Tablet 75 milliGRAM(s) Oral daily  dextrose 40% Gel 15 Gram(s) Oral once PRN  dextrose 5%. 1000 milliLiter(s) IV Continuous <Continuous>  dextrose 50% Injectable 12.5 Gram(s) IV Push once  fentaNYL    Injectable 12.5 MICROGram(s) IV Push every 3 hours PRN  glucagon  Injectable 1 milliGRAM(s) IntraMuscular once PRN  insulin lispro (HumaLOG) corrective regimen sliding scale   SubCutaneous every 6 hours  ofloxacin 0.3% Solution 1 Drop(s) Right EYE four times a day  ondansetron Injectable 4 milliGRAM(s) IV Push every 8 hours PRN  pantoprazole    Tablet 40 milliGRAM(s) Oral before breakfast  senna 2 Tablet(s) Oral at bedtime PRN  sodium chloride 0.9%. 1000 milliLiter(s) IV Continuous <Continuous>  traMADol 50 milliGRAM(s) Oral every 6 hours PRN      I/O's    11-18-19 @ 07:01  -  11-19-19 @ 07:00  --------------------------------------------------------  IN: 1700 mL / OUT: 1478 mL / NET: 222 mL    11-19-19 @ 07:01  -  11-19-19 @ 11:33  --------------------------------------------------------  IN: 300 mL / OUT: 400 mL / NET: -100 mL        Ventilator data:        LABS:                          10.4   8.38  )-----------( 172      ( 19 Nov 2019 05:58 )             31.8     11-19    139  |  109<H>  |  10  ----------------------------<  99  3.8   |  21<L>  |  0.47<L>    Ca    8.6      19 Nov 2019 05:58  Phos  2.3     11-19  Mg     1.9     11-19      PT/INR - ( 18 Nov 2019 12:51 )   PT: 12.3 sec;   INR: 1.09     PTT - ( 18 Nov 2019 12:51 )  PTT:>200.0 sec        CAPILLARY BLOOD GLUCOSE      POCT Blood Glucose.: 94 mg/dL (19 Nov 2019 06:20)  POCT Blood Glucose.: 120 mg/dL (18 Nov 2019 23:56)  POCT Blood Glucose.: 122 mg/dL (18 Nov 2019 21:59)  POCT Blood Glucose.: 153 mg/dL (18 Nov 2019 17:26)  POCT Blood Glucose.: 111 mg/dL (18 Nov 2019 12:12)    MRI - to my eye, negaetive for acute infarct or hemorrhage.

## 2019-11-18 NOTE — CHART NOTE - NSCHARTNOTEFT_GEN_A_CORE
Upon rounding with nurse manager and Dr. Sanchez it was noted patient rubbing right eye and c/o right eye discomfort.  No further complaints offered.  RN states patient was c/o right eye pain - observed application of wet compress to right eye.  Eye drops ketorolac and tobramycin ordered per Dr. Sanchez.  HEIDI Medina notified of right eye pain and plan for eye drops, recommended ophthalmology evaluation if no improvement by tomorrow (11/19).  RN informed of plan.

## 2019-11-18 NOTE — DISCHARGE NOTE PROVIDER - NSDCFUSCHEDAPPT_GEN_ALL_CORE_FT
CHILO GUERRERO ; 12/05/2019 ; [facility] [address] [place] [address] CHILO GUERRERO ; 12/05/2019 ; [facility] [address] [place] [address] CHILO GUERRERO ; 12/05/2019 ; [facility] [address] [place] [address] CHILO GUERRERO ; 12/05/2019 ; [facility] [address] [place] [address]

## 2019-11-18 NOTE — PROVIDER CONTACT NOTE (CHANGE IN STATUS NOTIFICATION) - SITUATION
Pt previously oriented to self, place. No longer answering questions about self, place correctly on neuro exam. Otherwise no drifts, pupils briskly reactive, face symmetric

## 2019-11-18 NOTE — PROGRESS NOTE ADULT - ASSESSMENT
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  Post op with some right eye pain and inattention.    Neuro  -q1h neuro checks  -flat for 6 hours  -ophtho consult per neurosurgery for eye    CV:  -cont ASA 81  -baseline EKG    Pulm: wean to RA as tolearted,    GI ADAT after sitting up 79/F y/o F [facility] ACOMM aneurysm coiling x2, s/p stent placement on 10/11/2019, now admitted for angio for repeat coiling.  Post op with some right eye pain and inattention.  Some post-procedure wordfinding difficulty but MRI intact, stable deficit.      Neuro  -q2h neuro checks  -ophtho consult per neurosurgery for eye - topical ofloxacin for right eye    CV:  -cont ASA 81, plavix 75  -SBP<160     Pulm: NOEMI    GI - regular diet 79/F y/o F [facility] ACOMM aneurysm coiling x2, s/p stent placement on 10/11/2019, now admitted for angio for repeat coiling.  Post op with some right eye pain and inattention.  Some post-procedure wordfinding difficulty but MRI intact, stable deficit.      DDx for new mild cognitive inattentiveness in this setting is: a) DWI-negative stroke (not common but plausible if in tiny eloquent cortical region), b) periprocedural vasospasm or vessel narrowing causing hypoperfusion syndrome (highly unlikely given uncomplicated coiling and stable deficit), c) mild delirium from prolonged sleep deprivation (reports little to no sleep in past 5 days).  Favor C however if persistent can perform CTA/CTperfusion to definitively exclude b.      Neuro  -q2h neuro checks  -Consider CTA/CTP per above  -ophtho consult per neurosurgery for eye - topical ofloxacin for right eye  -start melatonin QHS 2mg for sleep cycle readjustment    CV:  -cont ASA 81, plavix 75  -SBP<160     Pulm: NOEMI    GI - regular diet

## 2019-11-18 NOTE — PROGRESS NOTE ADULT - SUBJECTIVE AND OBJECTIVE BOX
NEUROSURGERY POST OP NOTE:     POD# 0 S/P Angiogram, cerebral, with embolization: general anesthesia, 6 fr sheath. R CFA. 1 vessel cerebral angiogram performed in L carotid. Findings consistent with R A2 to A1 stent and partially recanalized ACOMM aneurysm. [id number] recanalization. Endovascular coil embolization performed using 2 micro coils with significant improvement. Pt tolerated procedure well, pt extubated. No neurological deficits, hemodynamically stable.    R groin puncture site: 6 fr perclose device used. Hemostatis obtained without hematoma.     Pt transferred to PACU.    Hospital course:  11/18/19:  POD#0 s/p cerebral angio with coil embo of Rt ACOMM aneurysm.  Stable in PACU.    S: Patient is stable in PACU without complaints.  Denies headaches, nausea, visual changes, new numbness or weakness.      T(C): 36.1 (11-18-19 @ 11:55), Max: 36.1 (11-18-19 @ 11:55)  HR: 72 (11-18-19 @ 12:55) (68 - 72)  BP: 115/98 (11-18-19 @ 12:55) (99/45 - 123/62)  RR: 24 (11-18-19 @ 12:55) (16 - 24)  SpO2: 97% (11-18-19 @ 12:55) (94% - 97%)    acetaminophen   Tablet .. 650 milliGRAM(s) Oral every 6 hours PRN  bisacodyl 5 milliGRAM(s) Oral daily PRN  chlorhexidine 4% Liquid 1 Application(s) Topical Once  dextrose 40% Gel 15 Gram(s) Oral once PRN  dextrose 5%. 1000 milliLiter(s) IV Continuous <Continuous>  dextrose 50% Injectable 12.5 Gram(s) IV Push once  glucagon  Injectable 1 milliGRAM(s) IntraMuscular once PRN  insulin lispro (HumaLOG) corrective regimen sliding scale   SubCutaneous every 6 hours  senna 2 Tablet(s) Oral at bedtime PRN  sodium chloride 0.9%. 1000 milliLiter(s) IV Continuous <Continuous>    RADIOLOGY:  No new imaging.    Exam:  80 y/o  female AA&O x3 in NAD.  PERRL. CN II-XII grossly intact.  HASSAN x 4.  LE need to remain straight for 6 hours post angio, but follows commands/wiggles toes.  Sensation to light touch intact throughout.  No pronator drift, no dysmetria.  Moves B/L UE strongly.  DP/PT pulses 2+ b/l.    Labs:                       11.1   6.75  )-----------( 186      ( 18 Nov 2019 12:51 )             33.9       WOUND/DRAINS: Right groin with dressing c/d/i.    DEVICES: A-line left with slow oozing at entry site, dressing reinforced.      Assessment: 79yFemale F PMH acomm aneurysm coilingx2 with stent placement 10/11/19, HLD, CAD s/p stents, now s/p angio embolization of acomm aneurysm that had prior recanalization.     Plan: - transfer to ICU from PACU when bed available  - bed rest x 5 hrs  - monitor groin site for hematoma  - Neuro / vitals checks q1h  - cont home ASA, plavix, rosuvastatin (additional dose of ASA/plavix tonight per Dr. Lowe)  - pain control w/ tylenol    All above d/w Dr. Lowe and Dr. Guzmán.    Assessment:  Present when checked    []  GCS  E   V  M     Heart Failure: []Acute, [] acute on chronic , []chronic  Heart Failure:  [] Diastolic (HFpEF), [] Systolic (HFrEF), []Combined (HFpEF and HFrEF), [] RHF, [] Pulm HTN, [] Other    [] SEBASTIÁN, [] ATN, [] AIN, [] other  [] CKD1, [] CKD2, [] CKD 3, [] CKD 4, [] CKD 5, []ESRD    Encephalopathy: [] Metabolic, [] Hepatic, [] toxic, [] Neurological, [] Other    Abnormal Nurtitional Status: [] malnurtition (see nutrition note), [ ]underweight: BMI < 19, [] morbid obesity: BMI >40, [] Cachexia    [] Sepsis  [] hypovolemic shock,[] cardiogenic shock, [] hemorrhagic shock, [] neuogenic shock  [] Acute Respiratory Failure  []Cerebral edema, [] Brain compression/ herniation,   [] Functional quadriplegia  [] Acute blood loss anemia NEUROSURGERY POST OP NOTE:     POD# 0 S/P Angiogram, cerebral, with embolization: general anesthesia, 6 fr sheath. R CFA. 1 vessel cerebral angiogram performed in L carotid. Findings consistent with R A2 to A1 stent and partially recanalized ACOMM aneurysm. [id number] recanalization. Endovascular coil embolization performed using 2 micro coils with significant improvement. Pt tolerated procedure well, pt extubated. No neurological deficits, hemodynamically stable.    R groin puncture site: 6 fr perclose device used. Hemostatis obtained without hematoma.     Pt transferred to PACU.    Hospital course:  11/18/19:  POD#0 s/p cerebral angio with coil embo of Rt ACOMM aneurysm.  Stable in PACU.    S: Patient is stable in PACU without complaints.  Denies headaches, nausea, visual changes, new numbness or weakness.      T(C): 36.1 (11-18-19 @ 11:55), Max: 36.1 (11-18-19 @ 11:55)  HR: 72 (11-18-19 @ 12:55) (68 - 72)  BP: 115/98 (11-18-19 @ 12:55) (99/45 - 123/62)  RR: 24 (11-18-19 @ 12:55) (16 - 24)  SpO2: 97% (11-18-19 @ 12:55) (94% - 97%)    acetaminophen   Tablet .. 650 milliGRAM(s) Oral every 6 hours PRN  bisacodyl 5 milliGRAM(s) Oral daily PRN  chlorhexidine 4% Liquid 1 Application(s) Topical Once  dextrose 40% Gel 15 Gram(s) Oral once PRN  dextrose 5%. 1000 milliLiter(s) IV Continuous <Continuous>  dextrose 50% Injectable 12.5 Gram(s) IV Push once  glucagon  Injectable 1 milliGRAM(s) IntraMuscular once PRN  insulin lispro (HumaLOG) corrective regimen sliding scale   SubCutaneous every 6 hours  senna 2 Tablet(s) Oral at bedtime PRN  sodium chloride 0.9%. 1000 milliLiter(s) IV Continuous <Continuous>    RADIOLOGY:  No new imaging.    Exam:  80 y/o  female AA&O x3 in NAD.  PERRL. CN II-XII grossly intact.  HASSAN x 4.  LE need to remain straight for 6 hours post angio, but follows commands/wiggles toes.  Sensation to light touch intact throughout.  No pronator drift, no dysmetria.  Moves B/L UE strongly.  DP/PT pulses 2+ b/l.    Labs:                       11.1   6.75  )-----------( 186      ( 18 Nov 2019 12:51 )             33.9       WOUND/DRAINS: Right groin with dressing c/d/i.    DEVICES: A-line left with slow oozing at entry site, dressing reinforced.      Assessment: 79yFemale F PMH acomm aneurysm coilingx2 with stent placement 10/11/19, HLD, CAD s/p stents, now s/p angio embolization of acomm aneurysm that had prior recanalization.     Plan: - transfer to ICU from PACU when bed available  - bed rest x 5 hrs  - monitor groin site for hematoma  - Neuro / vitals checks q1h  - cont home ASA, plavix, rosuvastatin (additional dose of ASA/plavix tonight per Dr. Lowe)  - pain control w/ tylenol    All above d/w Dr. Lowe and Dr. Guzmán.      Addendum at 6 pm:   Patient started having headache around 3 pm and complained of right eye pain.  She was seen by anesthesia who suggested the patient had corneal abrasion and started tobramycin eye drops.  Patient was taken for urgent CT Head which showed postop changes, but no acute changes.  Opthalmology was consulted Dr. Burrell with recommendations for proparacaine eye drops to diagnose corneal abrasion.  Asa/plavix tonight as planned.    All d/w Dr. Lowe.    Assessment:  Present when checked    []  GCS  E   V  M     Heart Failure: []Acute, [] acute on chronic , []chronic  Heart Failure:  [] Diastolic (HFpEF), [] Systolic (HFrEF), []Combined (HFpEF and HFrEF), [] RHF, [] Pulm HTN, [] Other    [] SEBASTIÁN, [] ATN, [] AIN, [] other  [] CKD1, [] CKD2, [] CKD 3, [] CKD 4, [] CKD 5, []ESRD    Encephalopathy: [] Metabolic, [] Hepatic, [] toxic, [] Neurological, [] Other    Abnormal Nurtitional Status: [] malnurtition (see nutrition note), [ ]underweight: BMI < 19, [] morbid obesity: BMI >40, [] Cachexia    [] Sepsis  [] hypovolemic shock,[] cardiogenic shock, [] hemorrhagic shock, [] neuogenic shock  [] Acute Respiratory Failure  []Cerebral edema, [] Brain compression/ herniation,   [] Functional quadriplegia  [] Acute blood loss anemia NEUROSURGERY POST OP NOTE:     POD# 0 S/P Angiogram, cerebral, with embolization: general anesthesia, 6 fr sheath. R CFA. 1 vessel cerebral angiogram performed in L carotid. Findings consistent with R A2 to A1 stent and partially recanalized ACOMM aneurysm. [id number] recanalization. Endovascular coil embolization performed using 2 micro coils with significant improvement. Pt tolerated procedure well, pt extubated. No neurological deficits, hemodynamically stable.    R groin puncture site: 6 fr perclose device used. Hemostatis obtained without hematoma.     Pt transferred to PACU.    Hospital course:  11/18/19:  POD#0 s/p cerebral angio with coil embo of Rt ACOMM aneurysm.  Stable in PACU.    S: Patient is stable in PACU without complaints.  Denies headaches, nausea, visual changes, new numbness or weakness.      T(C): 36.1 (11-18-19 @ 11:55), Max: 36.1 (11-18-19 @ 11:55)  HR: 72 (11-18-19 @ 12:55) (68 - 72)  BP: 115/98 (11-18-19 @ 12:55) (99/45 - 123/62)  RR: 24 (11-18-19 @ 12:55) (16 - 24)  SpO2: 97% (11-18-19 @ 12:55) (94% - 97%)    acetaminophen   Tablet .. 650 milliGRAM(s) Oral every 6 hours PRN  bisacodyl 5 milliGRAM(s) Oral daily PRN  chlorhexidine 4% Liquid 1 Application(s) Topical Once  dextrose 40% Gel 15 Gram(s) Oral once PRN  dextrose 5%. 1000 milliLiter(s) IV Continuous <Continuous>  dextrose 50% Injectable 12.5 Gram(s) IV Push once  glucagon  Injectable 1 milliGRAM(s) IntraMuscular once PRN  insulin lispro (HumaLOG) corrective regimen sliding scale   SubCutaneous every 6 hours  senna 2 Tablet(s) Oral at bedtime PRN  sodium chloride 0.9%. 1000 milliLiter(s) IV Continuous <Continuous>    RADIOLOGY:  No new imaging.    Exam:  80 y/o  female AA&O x3 in NAD.  PERRL. CN II-XII grossly intact.  HASSAN x 4.  LE need to remain straight for 6 hours post angio, but follows commands/wiggles toes.  Sensation to light touch intact throughout.  No pronator drift, no dysmetria.  Moves B/L UE strongly.  DP/PT pulses 2+ b/l.    Labs:                       11.1   6.75  )-----------( 186      ( 18 Nov 2019 12:51 )             33.9       WOUND/DRAINS: Right groin with dressing c/d/i.    DEVICES: A-line left with slow oozing at entry site, dressing reinforced.      Assessment: 79yFemale F PMH acomm aneurysm coilingx2 with stent placement 10/11/19, HLD, CAD s/p stents, now s/p angio embolization of acomm aneurysm that had prior recanalization.     Plan: - transfer to ICU from PACU when bed available  - bed rest x 5 hrs  - monitor groin site for hematoma  - Neuro / vitals checks q1h  - cont home ASA, plavix, rosuvastatin (additional dose of ASA/plavix tonight per Dr. Lowe)  - pain control w/ tylenol    All above d/w Dr. Lowe and Dr. Guzmán.      Addendum at 6 pm:   Patient started having headache around 3 pm and complained of right eye pain.  She was seen by anesthesia who suggested the patient had corneal abrasion and started tobramycin eye drops.  Patient was taken for urgent CT Head which showed postop changes, but no acute changes.  Opthalmology was consulted Dr. Burrell with recommendations for proparacaine eye drops to diagnose corneal abrasion. As per Dr. Burrell optho, stop all pain drops for eyes, start ocufloxacin 4x/day x 3 days.  Patient needs f/u with outpatient optho upon discharge. Asa/plavix tonight as planned.    All d/w Dr. Lowe.    Assessment:  Present when checked    []  GCS  E   V  M     Heart Failure: []Acute, [] acute on chronic , []chronic  Heart Failure:  [] Diastolic (HFpEF), [] Systolic (HFrEF), []Combined (HFpEF and HFrEF), [] RHF, [] Pulm HTN, [] Other    [] SEBASTIÁN, [] ATN, [] AIN, [] other  [] CKD1, [] CKD2, [] CKD 3, [] CKD 4, [] CKD 5, []ESRD    Encephalopathy: [] Metabolic, [] Hepatic, [] toxic, [] Neurological, [] Other    Abnormal Nurtitional Status: [] malnurtition (see nutrition note), [ ]underweight: BMI < 19, [] morbid obesity: BMI >40, [] Cachexia    [] Sepsis  [] hypovolemic shock,[] cardiogenic shock, [] hemorrhagic shock, [] neuogenic shock  [] Acute Respiratory Failure  []Cerebral edema, [] Brain compression/ herniation,   [] Functional quadriplegia  [] Acute blood loss anemia

## 2019-11-18 NOTE — DISCHARGE NOTE PROVIDER - NSDCFUADDAPPT_GEN_ALL_CORE_FT
Follow up with   in 2 weeks. Your appt is for Dec 5th 12:30pm   Dr.Rapoport Hudson for corneal abrasion.

## 2019-11-18 NOTE — H&P ADULT - ASSESSMENT
Pt is 80 y/o F PMH acomm aneurysm coilingx2 with stent placement 10/11/19, HLD, CAD s/p stents presents for angio for acomm aneurysm recanalization repair w/ coils.    PLAN:    - transfer ICU post angio  - pt must remain flat  - monitor groin site for hematoma  - Neuro / vitals checks q1h  - cont home ASA, plavix, rosuvastatin  - pain control w/ tylenol Pt is 78 y/o F PMH acomm aneurysm coilingx2 with stent placement 10/11/19, HLD, CAD s/p stents presents for angio for acomm aneurysm recanalization repair w/ coils.    PLAN:    - transfer ICU post angio  - pt must remain flat x 5 hrs  - monitor groin site for hematoma  - Neuro / vitals checks q1h  - cont home ASA, plavix, rosuvastatin  - pain control w/ tylenol Pt is 78 y/o F PMH acomm aneurysm coilingx2 with stent placement 10/11/19, HLD, CAD s/p stents presents for angio for acomm aneurysm recanalization repair w/ coils.    PLAN:    - transfer ICU post angio  - bed rest x 5 hrs  - monitor groin site for hematoma  - Neuro / vitals checks q1h  - cont home ASA, plavix, rosuvastatin  - pain control w/ tylenol

## 2019-11-18 NOTE — DISCHARGE NOTE PROVIDER - PROVIDER TOKENS
PROVIDER:[TOKEN:[[id number]]] PROVIDER:[TOKEN:[[id number]]],PROVIDER:[TOKEN:[[id number]]],PROVIDER:[TOKEN:[[id number]]]

## 2019-11-18 NOTE — DISCHARGE NOTE PROVIDER - NSDCMRMEDTOKEN_GEN_ALL_CORE_FT
acetaminophen 325 mg oral tablet: 2 tab(s) orally every 6 hours, As needed, Temp greater or equal to 38.5C (101.3F), Moderate Pain (4 - 6)  aspirin 325 mg oral tablet: 1 tab(s) orally once a day  Plavix 75 mg oral tablet: 1 tab(s) orally once a day MDD:1  rosuvastatin 10 mg oral tablet: 1 tab(s) orally once a day acetaminophen 325 mg oral tablet: 2 tab(s) orally every 6 hours, As needed, Temp greater or equal to 38.5C (101.3F), Moderate Pain (4 - 6)  aspirin 325 mg oral tablet: 1 tab(s) orally once a day  ofloxacin 0.3% ophthalmic solution: 1 drop(s) to each affected eye 4 times a day stop on 11/22  Plavix 75 mg oral tablet: 1 tab(s) orally once a day MDD:1  rosuvastatin 10 mg oral tablet: 1 tab(s) orally once a day  senna oral tablet: 2 tab(s) orally once a day (at bedtime), As needed, Constipation  traMADol 50 mg oral tablet: 1 tab(s) orally every 8 hours, As Needed -Moderate Pain (4 - 6) MDD:3 tabs acetaminophen 325 mg oral tablet: 2 tab(s) orally every 6 hours, As needed, Temp greater or equal to 38.5C (101.3F), Moderate Pain (4 - 6)  aspirin 325 mg oral tablet: 1 tab(s) orally once a day  ofloxacin 0.3% ophthalmic solution: 1 drop(s) to each affected eye 4 times a day stop on 11/22  rosuvastatin 10 mg oral tablet: 1 tab(s) orally once a day  senna oral tablet: 2 tab(s) orally once a day (at bedtime), As needed, Constipation  traMADol 50 mg oral tablet: 1 tab(s) orally every 8 hours, As Needed -Moderate Pain (4 - 6) MDD:3 tabs acetaminophen 325 mg oral tablet: 2 tab(s) orally every 6 hours, As needed, Temp greater or equal to 38.5C (101.3F), Moderate Pain (4 - 6)  aspirin 325 mg oral tablet: 1 tab(s) orally once a day  clopidogrel 75 mg oral tablet: 1 tab(s) orally once a day  ofloxacin 0.3% ophthalmic solution: 1 drop(s) to each affected eye 4 times a day stop on 11/22  rosuvastatin 10 mg oral tablet: 1 tab(s) orally once a day  senna oral tablet: 2 tab(s) orally once a day (at bedtime), As needed, Constipation  traMADol 50 mg oral tablet: 1 tab(s) orally every 8 hours, As Needed -Moderate Pain (4 - 6) MDD:3 tabs

## 2019-11-18 NOTE — PROVIDER CONTACT NOTE (CHANGE IN STATUS NOTIFICATION) - ASSESSMENT
Pt is more confused on assessment, only oriented to self. She is asking questions that do not make sense,  ex. "How is the doctor?"

## 2019-11-18 NOTE — H&P ADULT - HISTORY OF PRESENT ILLNESS
Pt is a 80 y/o F PMH ACOMM aneurysm coiling x2, stent placement on 10/11/2019, HLD, CAD s/p stents presents for angio for aneurysm recanalization repair. During her last angio, a microcatheter was caught on the stent so aneurysm repair was held until today. Pt has been on ASA 325mg and Plavix 75mg daily. Her last dose of ASA 325mg was last night. Her aneurysm was originally discovered during workup for HA and visual changes. She reports mild R hearing loss which she has been worked up for by ENT and has been receiving steroid shots in the ear. She denies any current HA or vision changes.

## 2019-11-18 NOTE — BRIEF OPERATIVE NOTE - OPERATION/FINDINGS
general anesthesia, 6 fr sheath. R CFA. 1 vessel cerebral angiogram performed in L carotid. Findings consistent with R A2 to A1 stent and partially recanalized ACOMM aneurysm. [id number] recanalization. Endovascular coil embolization performed using 2 micro coils with significant improvement. Pt tolerated procedure well, pt extubated. No neurological deficits, hemodynamically stable.    R groin puncture site: 6 fr perclose device used. Hemostatis obtained without hematoma.     Pt transferred to PACU.     Above d/w Dr. Lowe

## 2019-11-18 NOTE — DISCHARGE NOTE PROVIDER - HOSPITAL COURSE
POD# 0 S/P Angiogram, cerebral, with embolization: general anesthesia, 6 fr sheath. R CFA. 1 vessel cerebral angiogram performed in L carotid. Findings consistent with R A2 to A1 stent and partially recanalized ACOMM aneurysm. [id number] recanalization. Endovascular coil embolization performed using 2 micro coils with significant improvement. Pt tolerated procedure well, pt extubated. No neurological deficits, hemodynamically stable.        R groin puncture site: 6 fr perclose device used. Hemostatis obtained without hematoma.         Hospital course:    11/18/19:  POD#0 s/p cerebral angio with coil embo of Rt ACOMM aneurysm.  Stable in PACU.    < from: CT Head No Cont (11.18.19 @ 16:42) >        IMPRESSION: Patient status post stenting and coiling embolization of a     common aneurysm with extensive artifact. No definite intracranial     hemorrhage or acute transcortical infarct.            < end of copied text > POD# 0 S/P Angiogram, cerebral, with embolization: general anesthesia, 6 fr sheath. R CFA. 1 vessel cerebral angiogram performed in L carotid. Findings consistent with R A2 to A1 stent and partially recanalized ACOMM aneurysm. [id number] recanalization. Endovascular coil embolization performed using 2 micro coils with significant improvement. Pt tolerated procedure well, pt extubated. No neurological deficits, hemodynamically stable.        R groin puncture site: 6 fr perclose device used. Hemostatis obtained without hematoma.         Hospital course:    11/18/19:  POD#0 s/p cerebral angio with coil embo of Rt ACOMM aneurysm.  Stable in PACU.    Pt noted to possibly have R corneal abrasion started on Abx eye drops.    < from: CT Head No Cont (11.18.19 @ 16:42) >        IMPRESSION: Patient status post stenting and coiling embolization of a     common aneurysm with extensive artifact. No definite intracranial     hemorrhage or acute transcortical infarct.            < end of copied text >        1/19 – KIERRA overnight, transferred to ICU for observation. post procedure MRI performed, no strokes. R eye improving. Confused post op, CTH x 2 stable.     11/20 -- KIERRA overnight. Neuro stable, MRI with no infarcts. No confusion .Neurology and ophthalmology consults called. Dispo home  today with Home PT/OT

## 2019-11-18 NOTE — H&P ADULT - NSHPPHYSICALEXAM_GEN_ALL_CORE
GEN: pt laying in hospital bed, NAD  NEURO: AOx3. OE spont, FC. CNII-XII grossly intact. EOMI, PERRL. No pronator drift. 5/5 strength throughout. SILT.   CV: RRR, +S1/S2  PULM: CTAB. Chest rises/falls symmetrically  GI: Abd soft, nontender, non-distended  EXT: Ext warm, no edema, nontender. DP 2+ b/l

## 2019-11-18 NOTE — DISCHARGE NOTE PROVIDER - CARE PROVIDERS DIRECT ADDRESSES
[email or web address],DirectAddress_Unknown

## 2019-11-18 NOTE — DISCHARGE NOTE PROVIDER - NSDCCPCAREPLAN_GEN_ALL_CORE_FT
1410 patient arrived from cath lab s/p a flutter ablation and pm placement, right groin dressing clean, left chest dressing clean, vss, denies pain, s.o.b, n/v, discussed poc with patient and family agreeable.  1515 patient tolerating fluids   1700 report given to Marce Randall T731 bed 2 all questions answered, currently waiting for room cleaning.  1722 patient transported to T731 bed 2 checked rt groin and lt chest dressing with AHSAN rutledge all clean no bleeding no hematoma.  
PRINCIPAL DISCHARGE DIAGNOSIS  Diagnosis: Brain aneurysm  Assessment and Plan of Treatment:

## 2019-11-18 NOTE — DISCHARGE NOTE PROVIDER - CARE PROVIDER_API CALL
Miguel Lowe)  Neurological Surgery  [address], [place] [facility]  Phone: [phone]  Fax: [phone]  Follow Up Time: Miguel Lowe)  Neurological Surgery  [address], [address] [place] [address]  Phone: [phone]  Fax: [phone]  Follow Up Time:     Cas Patel)  Neurology; Neuromuscular Medicine  [address], [address] [place] [address]  Phone: [phone]  Fax: [phone]  Follow Up Time:     Kati Clement)  Ophthalmology  [address] [address]  Phone: [phone]  Fax: [phone]  Follow Up Time:

## 2019-11-18 NOTE — PROVIDER CONTACT NOTE (CHANGE IN STATUS NOTIFICATION) - ACTION/TREATMENT ORDERED:
Pt taken for CTH at 2130 after becoming more confused. No interventions at this time, will continue to monitor

## 2019-11-18 NOTE — DISCHARGE NOTE PROVIDER - NSDCCPTREATMENT_GEN_ALL_CORE_FT
PRINCIPAL PROCEDURE  Procedure: Angiogram, cerebral, with intracranial aneurysm embolization  Findings and Treatment: Return home to regain your independent activity  Once home no heavy lifting anything greater than 10 pounds  No bending or twisting  You will have little white strips by the groin   Leave it opened to air  They will fall off by themselves within 5 days, if they dont you can remove them  Do Not bath or swim for a week after the procedure  You are able to shower and get the strips wet  Pat it dry with a clean towel  Once home call the office to make a follow up appointment with Dr Lowe  Any temperature greater than 101.5 degrees F or drainage from the groin are or a bulge in the area where the catheter was inserted call the office

## 2019-11-18 NOTE — DISCHARGE NOTE PROVIDER - NSDCFUADDINST_GEN_ALL_CORE_FT
ACTIVITY:     Do not drive or operate hazardous machinery                         Do not engage in in sports, heavy work or heavy lifting     DIET:             You may resume your regular diet.                        Stay well hydrated.                        Abstain from alcohol     HYGIENE:     Shower daily.                      No bath, hot tub or swim for 5   days.    PAIN MEDICATION:   A mild pain at the puncture siteis not unusual.                                        You may take Tylenol 1-2 tabs every 4-6 hours as needed, for one day.                                        If the pain persists contact the office at [phone]  You were given a perscription of Tramadol, which is a narcotic. It can cause itchiness, constipation and nausea. Take with food and stool softners.    SPECIAL INSTRUCTIONS:  Signs and symptoms to look out for:       1. Jer bleeding from the puncture site is an emergency.            Put direct pressure on the site and go directly to your local Emergency   Room for treatment.       2. Bleeding under the skin may also occur and a small "black and blue may be expected.         If there appears to be an expanding mass or swelling around the puncture site, apply manual compression and go          immediately to your local Emergency Room for treatment.       3. If your foot/leg or hand/arm (puncture site) becomes cool or blue and/or you are unable to move it, this must be treated as an   emergency.         go directly to your local emergency room for treatment.       4. Excessive puncture site pain is abnormal and should be assessed.      5.  Look out for signs of infection in the puncture site: fever, red streaking of the leg, discharge, pain.      6.  Lack of adequate urine output, provided you are drinking enough fluids, may be cause for concern, notify us if you think this is the case.    7. Worsening headache, fever>101, weakness in extremities, trauma, change in mental status.    8. Take Asprin 81mg and Plavix 75mg Daily. ACTIVITY:     Do not drive or operate hazardous machinery                         Do not engage in in sports, heavy work or heavy lifting     DIET:             You may resume your regular diet.                        Stay well hydrated.                        Abstain from alcohol     HYGIENE:     Shower daily.                      No bath, hot tub or swim for 5   days.    PAIN MEDICATION:   A mild pain at the puncture siteis not unusual.                                        You may take Tylenol 1-2 tabs every 4-6 hours as needed, for one day.                                        If the pain persists contact the office at [phone]  You were given a perscription of Tramadol, which is a narcotic. It can cause itchiness, constipation and nausea. Take with food and stool softners.    SPECIAL INSTRUCTIONS:  Signs and symptoms to look out for:       1. Jer bleeding from the puncture site is an emergency.            Put direct pressure on the site and go directly to your local Emergency   Room for treatment.       2. Bleeding under the skin may also occur and a small "black and blue may be expected.         If there appears to be an expanding mass or swelling around the puncture site, apply manual compression and go          immediately to your local Emergency Room for treatment.       3. If your foot/leg or hand/arm (puncture site) becomes cool or blue and/or you are unable to move it, this must be treated as an   emergency.         go directly to your local emergency room for treatment.       4. Excessive puncture site pain is abnormal and should be assessed.      5.  Look out for signs of infection in the puncture site: fever, red streaking of the leg, discharge, pain.      6.  Lack of adequate urine output, provided you are drinking enough fluids, may be cause for concern, notify us if you think this is the case.    7. Worsening headache, fever>101, weakness in extremities, trauma, change in mental status.    8. Take Asprin 325mg and Plavix 75mg Daily.

## 2019-11-18 NOTE — H&P ADULT - NSHPREVIEWOFSYSTEMS_GEN_ALL_CORE
REVIEW OF SYSTEMS      General:	no recent illnesses, no recent wt gain/loss    Skin/Breast:  intact  	  Ophthalmologic:  negative  	  ENMT:	+ R hearing loss    Respiratory and Thorax: no coughing, wheezing, recent URI  	  Cardiovascular: no chest pain, CURTIS    Gastrointestinal:	soft, non tender    Genitourinary: no frequency, dysuria    Musculoskeletal:	negative    Neurological:	see HPI    Psychiatric:	negative    Hematology/Lymphatics:	negative    Endocrine:  	negative    Allergic/Immunologic:  Negative

## 2019-11-18 NOTE — PROGRESS NOTE ADULT - ATTENDING COMMENTS
ATTENDING ATTESTATION:  I was physically present for the key portions of the evaluation and management (E/M) service provided.  I agree with the above history, physical and plan, which I have reviewed and edited where appropriate.  Patient at high risk for neurological deterioration or death due to:  post-op hemorrhage    Critical care time:  I have personally provided  35  minutes of critical care time, excluding time spent on separately-billable procedures.      Plan discussed with RN, PA team.

## 2019-11-19 ENCOUNTER — OTHER (OUTPATIENT)
Age: 79
End: 2019-11-19

## 2019-11-19 LAB
ANION GAP SERPL CALC-SCNC: 9 MMOL/L — SIGNIFICANT CHANGE UP (ref 5–17)
BUN SERPL-MCNC: 10 MG/DL — SIGNIFICANT CHANGE UP (ref 7–23)
CALCIUM SERPL-MCNC: 8.6 MG/DL — SIGNIFICANT CHANGE UP (ref 8.4–10.5)
CHLORIDE SERPL-SCNC: 109 MMOL/L — HIGH (ref 96–108)
CO2 SERPL-SCNC: 21 MMOL/L — LOW (ref 22–31)
CREAT SERPL-MCNC: 0.47 MG/DL — LOW (ref 0.5–1.3)
GLUCOSE BLDC GLUCOMTR-MCNC: 132 MG/DL — HIGH (ref 70–99)
GLUCOSE BLDC GLUCOMTR-MCNC: 84 MG/DL — SIGNIFICANT CHANGE UP (ref 70–99)
GLUCOSE BLDC GLUCOMTR-MCNC: 94 MG/DL — SIGNIFICANT CHANGE UP (ref 70–99)
GLUCOSE SERPL-MCNC: 99 MG/DL — SIGNIFICANT CHANGE UP (ref 70–99)
HCT VFR BLD CALC: 31.8 % — LOW (ref 34.5–45)
HGB BLD-MCNC: 10.4 G/DL — LOW (ref 11.5–15.5)
MAGNESIUM SERPL-MCNC: 1.9 MG/DL — SIGNIFICANT CHANGE UP (ref 1.6–2.6)
MCHC RBC-ENTMCNC: 31 PG — SIGNIFICANT CHANGE UP (ref 27–34)
MCHC RBC-ENTMCNC: 32.7 GM/DL — SIGNIFICANT CHANGE UP (ref 32–36)
MCV RBC AUTO: 94.9 FL — SIGNIFICANT CHANGE UP (ref 80–100)
NRBC # BLD: 0 /100 WBCS — SIGNIFICANT CHANGE UP (ref 0–0)
PA ADP PRP-ACNC: 231 PRU — SIGNIFICANT CHANGE UP (ref 194–418)
PHOSPHATE SERPL-MCNC: 2.3 MG/DL — LOW (ref 2.5–4.5)
PLATELET # BLD AUTO: 172 K/UL — SIGNIFICANT CHANGE UP (ref 150–400)
POTASSIUM SERPL-MCNC: 3.8 MMOL/L — SIGNIFICANT CHANGE UP (ref 3.5–5.3)
POTASSIUM SERPL-SCNC: 3.8 MMOL/L — SIGNIFICANT CHANGE UP (ref 3.5–5.3)
RBC # BLD: 3.35 M/UL — LOW (ref 3.8–5.2)
RBC # FLD: 12.9 % — SIGNIFICANT CHANGE UP (ref 10.3–14.5)
SODIUM SERPL-SCNC: 139 MMOL/L — SIGNIFICANT CHANGE UP (ref 135–145)
WBC # BLD: 8.38 K/UL — SIGNIFICANT CHANGE UP (ref 3.8–10.5)
WBC # FLD AUTO: 8.38 K/UL — SIGNIFICANT CHANGE UP (ref 3.8–10.5)

## 2019-11-19 PROCEDURE — 99232 SBSQ HOSP IP/OBS MODERATE 35: CPT

## 2019-11-19 PROCEDURE — 70551 MRI BRAIN STEM W/O DYE: CPT | Mod: 26

## 2019-11-19 RX ORDER — POTASSIUM PHOSPHATE, MONOBASIC POTASSIUM PHOSPHATE, DIBASIC 236; 224 MG/ML; MG/ML
30 INJECTION, SOLUTION INTRAVENOUS ONCE
Refills: 0 | Status: COMPLETED | OUTPATIENT
Start: 2019-11-19 | End: 2019-11-19

## 2019-11-19 RX ORDER — LANOLIN ALCOHOL/MO/W.PET/CERES
2 CREAM (GRAM) TOPICAL
Refills: 0 | Status: DISCONTINUED | OUTPATIENT
Start: 2019-11-19 | End: 2019-11-20

## 2019-11-19 RX ADMIN — Medication 1 DROP(S): at 11:42

## 2019-11-19 RX ADMIN — Medication 2 MILLIGRAM(S): at 21:42

## 2019-11-19 RX ADMIN — Medication 1 DROP(S): at 18:31

## 2019-11-19 RX ADMIN — POTASSIUM PHOSPHATE, MONOBASIC POTASSIUM PHOSPHATE, DIBASIC 83.33 MILLIMOLE(S): 236; 224 INJECTION, SOLUTION INTRAVENOUS at 08:56

## 2019-11-19 RX ADMIN — CLOPIDOGREL BISULFATE 75 MILLIGRAM(S): 75 TABLET, FILM COATED ORAL at 11:42

## 2019-11-19 RX ADMIN — Medication 1 DROP(S): at 06:42

## 2019-11-19 RX ADMIN — PANTOPRAZOLE SODIUM 40 MILLIGRAM(S): 20 TABLET, DELAYED RELEASE ORAL at 06:43

## 2019-11-19 RX ADMIN — Medication 325 MILLIGRAM(S): at 11:42

## 2019-11-19 NOTE — OCCUPATIONAL THERAPY INITIAL EVALUATION ADULT - VISUAL ASSESSMENT: VISUAL NEGLECT
Patient presents to infusion for Procrit injection. Patient does not meet criteria for Procrit injection today with Hgb of 11.3.     WBC (K/mcL)   Date Value   06/04/2019 6.1     RBC (mil/mcL)   Date Value   06/04/2019 3.93 (L)     HCT (%)   Date Value   06/04/2019 34.5 (L)     HGB (g/dL)   Date Value   06/04/2019 11.3 (L)     PLT   Date Value   06/04/2019 141 K/mcL   09/30/2016 179 K/uL   07/09/2012 152 K/uL     JOSÉ Lugo is supervising provider.    none

## 2019-11-19 NOTE — OCCUPATIONAL THERAPY INITIAL EVALUATION ADULT - PLANNED THERAPY INTERVENTIONS, OT EVAL
bed mobility training/transfer training/cognitive, visual perceptual/ADL retraining/balance training

## 2019-11-19 NOTE — PHYSICAL THERAPY INITIAL EVALUATION ADULT - PERTINENT HX OF CURRENT PROBLEM, REHAB EVAL
79 year old female with HLD, CAD s/p cardiac stents with previously coiled ACOMM artery aneurysm s/p cerebral angiogram for ACOMM stent placement 10/11/19, now s/p cerebral angiogram for coil embolization of ACOMM artery aneurysm 11/18.

## 2019-11-19 NOTE — PHYSICAL THERAPY INITIAL EVALUATION ADULT - MODALITIES TREATMENT COMMENTS
smile, cheek puff, eyebrow raise: symmetrical. tongue protrusion: midline. Visual tracking (H test): smooth pursuit. Visual Field Test (quadrant test): WNL B/L

## 2019-11-19 NOTE — PROGRESS NOTE ADULT - SUBJECTIVE AND OBJECTIVE BOX
S/Overnight events:  Patient confused overnight, random complaints of headache and abdominal pain.    Hospital Course:  11/18 – s/p cerebral angiogram for coil emboliztion of ACOMM artery aneurysm. CT Head x2 post procedure for confusion, stable.  11/19 –       Vital Signs Last 24 Hrs  T(C): 37 (19 Nov 2019 05:00), Max: 37.2 (18 Nov 2019 22:20)  T(F): 98.6 (19 Nov 2019 05:00), Max: 98.9 (18 Nov 2019 22:20)  HR: 78 (19 Nov 2019 05:00) (68 - 92)  BP: 113/63 (19 Nov 2019 05:00) (99/45 - 157/81)  BP(mean): 87 (19 Nov 2019 05:00) (72 - 104)  RR: 22 (19 Nov 2019 05:00) (12 - 26)  SpO2: 95% (19 Nov 2019 05:00) (93% - 100%)    I&O's Detail    18 Nov 2019 07:01  -  19 Nov 2019 06:05  --------------------------------------------------------  IN:    IV PiggyBack: 300 mL    sodium chloride 0.9%: 400 mL    sodium chloride 0.9%.: 700 mL  Total IN: 1400 mL    OUT:    Indwelling Catheter - Urethral: 1333 mL  Total OUT: 1333 mL    Total NET: 67 mL        I&O's Summary    18 Nov 2019 07:01  -  19 Nov 2019 06:05  --------------------------------------------------------  IN: 1400 mL / OUT: 1333 mL / NET: 67 mL        PHYSICAL EXAM:  Gen: NAD, AAOx2 with choices  HEENT: PERRL. EOMI.  Neck: FROM, nontender  Lungs: Clear b/l  Heart: S1, S2. NSR.  Abd: Soft, NT/ND. +BS  Exts: Pulses 2+ throughout. Right groin dressing C/D/I.  Neuro: CNs II-XII intact. 5/5 str x 4 extremities. Following commands. Confused, perseverating    TUBES/LINES:  [] CVC  [] A-line  [] Lumbar Drain  [] Ventriculostomy  [] Other    DIET:  [] NPO  [x] Mechanical  [] Tube feeds    LABS:                        11.1   6.75  )-----------( 186      ( 18 Nov 2019 12:51 )             33.9     11-18    142  |  108  |  11  ----------------------------<  125<H>  3.3<L>   |  24  |  0.41<L>    Ca    8.0<L>      18 Nov 2019 12:51      PT/INR - ( 18 Nov 2019 12:51 )   PT: 12.3 sec;   INR: 1.09          PTT - ( 18 Nov 2019 12:51 )  PTT:>200.0 sec        CAPILLARY BLOOD GLUCOSE      POCT Blood Glucose.: 120 mg/dL (18 Nov 2019 23:56)  POCT Blood Glucose.: 122 mg/dL (18 Nov 2019 21:59)  POCT Blood Glucose.: 153 mg/dL (18 Nov 2019 17:26)  POCT Blood Glucose.: 111 mg/dL (18 Nov 2019 12:12)      Drug Levels: [] N/A    CSF Analysis: [] N/A      Allergies    No Known Allergies    Intolerances      MEDICATIONS:  Antibiotics:    Neuro:  acetaminophen   Tablet .. 650 milliGRAM(s) Oral every 6 hours PRN  aspirin 325 milliGRAM(s) Oral daily  fentaNYL    Injectable 12.5 MICROGram(s) IV Push every 3 hours PRN  ondansetron Injectable 4 milliGRAM(s) IV Push every 8 hours PRN  traMADol 50 milliGRAM(s) Oral every 6 hours PRN    Anticoagulation:  clopidogrel Tablet 75 milliGRAM(s) Oral daily    OTHER:  bisacodyl 5 milliGRAM(s) Oral daily PRN  chlorhexidine 4% Liquid 1 Application(s) Topical Once  dextrose 40% Gel 15 Gram(s) Oral once PRN  dextrose 50% Injectable 12.5 Gram(s) IV Push once  glucagon  Injectable 1 milliGRAM(s) IntraMuscular once PRN  insulin lispro (HumaLOG) corrective regimen sliding scale   SubCutaneous every 6 hours  ofloxacin 0.3% Solution 1 Drop(s) Right EYE four times a day  pantoprazole    Tablet 40 milliGRAM(s) Oral before breakfast  senna 2 Tablet(s) Oral at bedtime PRN    IVF:  dextrose 5%. 1000 milliLiter(s) IV Continuous <Continuous>  sodium chloride 0.9%. 1000 milliLiter(s) IV Continuous <Continuous>    CULTURES:    RADIOLOGY & ADDITIONAL TESTS:      ASSESSMENT: 79 year old female with HLD, CAD s/p cardiac stents with previously coiled ACOMM artery aneurysm s/p cerebral angiogram for ACOMM stent placement 10/11/19, now s/p cerebral angiogram for coil embolization of ACOMM artery aneurysm 11/18.    PLAN:  Neuro:  Pain meds PRN,  CT Head x2 performed post procedure stable,  Confusion – likely secondary to Delirium    Cardiovascular:  Normotensive BP goals,  Continue home medications,  Daily labs, electrolyte repletion PRN  Continue ASA/Plavix for recent cerebral stent placement.    Pulmonary:  Incentive spirometry as tolerated  Saturating well on RA    Renal:  DC Calix in AM for TOV  Continue IVF if poor PO inake    GI:  PO diet as tolerated,  Stool softeners PRN    Endo:  ISS PRN    ID:  Afebrile    DVT Prophylaxis:  SCDs    DISPOSITION:  PT/OT evaluation,  Continue to monitor for Delirium,  D/w Dr. Lowe, Dr. Guzmán    Assessment: present when checked     [] GCS   E   V   M     Heart Failure: [] Acute, [] acute on chronic, [] chronic   Heart Failure: [] Diastolic (HFpEF), [] Systolic (HRrEF), [] Combined (HFpEF and HFrEF), [] RHF, [] Pulm HTN, [] Other     [] SEBASTIÁN, [] ATN, [] AIN, [] other   [] CKD1, [] CKD2, [] CKD3, [] CKD4, [] CKD5, [] ESRD     Encephalopathy: [] Metabolic, [] Hepatic, [] Toxic, [] Neurological, [] Other     Abnormal Nutritional Status: [] malnutrition (see nutrition note), []underweight: BMI <19, [] morbid obesity: BMI >40, [] Cachexia     [] Sepsis   [] Hypovolemic shock, [] Cardiogenic shock, [] Hemorrhagic shock, [] Neurogenic shock   [] Acute respiratory failure   [] Cerebral edema, [] Brain compression / herniation   [] Functional quadriplegia   [] Acute blood loss anemia S/Overnight events:  Patient confused overnight, random complaints of headache and abdominal pain.    Hospital Course:  11/18 – s/p cerebral angiogram for coil emboliztion of ACOMM artery aneurysm. CT Head x2 post procedure for confusion, stable.  11/19 – KIERRA overnight, transferred to ICU for observation. post procedure MRI performed, no strokes.      Vital Signs Last 24 Hrs  T(C): 37 (19 Nov 2019 05:00), Max: 37.2 (18 Nov 2019 22:20)  T(F): 98.6 (19 Nov 2019 05:00), Max: 98.9 (18 Nov 2019 22:20)  HR: 78 (19 Nov 2019 05:00) (68 - 92)  BP: 113/63 (19 Nov 2019 05:00) (99/45 - 157/81)  BP(mean): 87 (19 Nov 2019 05:00) (72 - 104)  RR: 22 (19 Nov 2019 05:00) (12 - 26)  SpO2: 95% (19 Nov 2019 05:00) (93% - 100%)    I&O's Detail    18 Nov 2019 07:01  -  19 Nov 2019 06:05  --------------------------------------------------------  IN:    IV PiggyBack: 300 mL    sodium chloride 0.9%: 400 mL    sodium chloride 0.9%.: 700 mL  Total IN: 1400 mL    OUT:    Indwelling Catheter - Urethral: 1333 mL  Total OUT: 1333 mL    Total NET: 67 mL        I&O's Summary    18 Nov 2019 07:01  -  19 Nov 2019 06:05  --------------------------------------------------------  IN: 1400 mL / OUT: 1333 mL / NET: 67 mL        PHYSICAL EXAM:  Gen: NAD, AAOx2 with choices  HEENT: PERRL. EOMI.  Neck: FROM, nontender  Lungs: Clear b/l  Heart: S1, S2. NSR.  Abd: Soft, NT/ND. +BS  Exts: Pulses 2+ throughout. Right groin dressing C/D/I.  Neuro: CNs II-XII intact. 5/5 str x 4 extremities. Following commands. Confused, perseverating    TUBES/LINES:  pivs    DIET:  [] NPO  [x] Mechanical  [] Tube feeds    LABS:                        11.1   6.75  )-----------( 186      ( 18 Nov 2019 12:51 )             33.9     11-18    142  |  108  |  11  ----------------------------<  125<H>  3.3<L>   |  24  |  0.41<L>    Ca    8.0<L>      18 Nov 2019 12:51      PT/INR - ( 18 Nov 2019 12:51 )   PT: 12.3 sec;   INR: 1.09          PTT - ( 18 Nov 2019 12:51 )  PTT:>200.0 sec        CAPILLARY BLOOD GLUCOSE      POCT Blood Glucose.: 120 mg/dL (18 Nov 2019 23:56)  POCT Blood Glucose.: 122 mg/dL (18 Nov 2019 21:59)  POCT Blood Glucose.: 153 mg/dL (18 Nov 2019 17:26)  POCT Blood Glucose.: 111 mg/dL (18 Nov 2019 12:12)      Drug Levels: [] N/A    CSF Analysis: [] N/A      Allergies    No Known Allergies    Intolerances      MEDICATIONS:  Antibiotics:    Neuro:  acetaminophen   Tablet .. 650 milliGRAM(s) Oral every 6 hours PRN  aspirin 325 milliGRAM(s) Oral daily  fentaNYL    Injectable 12.5 MICROGram(s) IV Push every 3 hours PRN  ondansetron Injectable 4 milliGRAM(s) IV Push every 8 hours PRN  traMADol 50 milliGRAM(s) Oral every 6 hours PRN    Anticoagulation:  clopidogrel Tablet 75 milliGRAM(s) Oral daily    OTHER:  bisacodyl 5 milliGRAM(s) Oral daily PRN  chlorhexidine 4% Liquid 1 Application(s) Topical Once  dextrose 40% Gel 15 Gram(s) Oral once PRN  dextrose 50% Injectable 12.5 Gram(s) IV Push once  glucagon  Injectable 1 milliGRAM(s) IntraMuscular once PRN  insulin lispro (HumaLOG) corrective regimen sliding scale   SubCutaneous every 6 hours  ofloxacin 0.3% Solution 1 Drop(s) Right EYE four times a day  pantoprazole    Tablet 40 milliGRAM(s) Oral before breakfast  senna 2 Tablet(s) Oral at bedtime PRN    IVF:  dextrose 5%. 1000 milliLiter(s) IV Continuous <Continuous>  sodium chloride 0.9%. 1000 milliLiter(s) IV Continuous <Continuous>    CULTURES:    RADIOLOGY & ADDITIONAL TESTS:      ASSESSMENT: 79 year old female with HLD, CAD s/p cardiac stents with previously coiled ACOMM artery aneurysm s/p cerebral angiogram for ACOMM stent placement 10/11/19, now s/p cerebral angiogram for coil embolization of ACOMM artery aneurysm 11/18.    PLAN:  Neuro:  Pain meds PRN,  CT Head x2 performed post procedure stable,  Confusion – likely secondary to Delirium  MRI stable    Cardiovascular:  Normotensive BP goals,  Continue home medications,  Daily labs, electrolyte repletion PRN  Continue ASA/Plavix for recent cerebral stent placement.    Pulmonary:  Incentive spirometry as tolerated  Saturating well on RA    Renal:  DC Calix in AM for TOV  Continue IVF if poor PO inake    GI:  PO diet as tolerated,  Stool softeners PRN    Endo:  ISS PRN    ID:  Afebrile    DVT Prophylaxis:  SCDs    DISPOSITION:  PT/OT evaluation,  Continue to monitor for Delirium,  D/w Dr. Lowe, Dr. Guzmán    Assessment: present when checked     [] GCS   E   V   M     Heart Failure: [] Acute, [] acute on chronic, [] chronic   Heart Failure: [] Diastolic (HFpEF), [] Systolic (HRrEF), [] Combined (HFpEF and HFrEF), [] RHF, [] Pulm HTN, [] Other     [] SEBASTIÁN, [] ATN, [] AIN, [] other   [] CKD1, [] CKD2, [] CKD3, [] CKD4, [] CKD5, [] ESRD     Encephalopathy: [] Metabolic, [] Hepatic, [] Toxic, [] Neurological, [] Other     Abnormal Nutritional Status: [] malnutrition (see nutrition note), []underweight: BMI <19, [] morbid obesity: BMI >40, [] Cachexia     [] Sepsis   [] Hypovolemic shock, [] Cardiogenic shock, [] Hemorrhagic shock, [] Neurogenic shock   [] Acute respiratory failure   [] Cerebral edema, [] Brain compression / herniation   [] Functional quadriplegia   [] Acute blood loss anemia

## 2019-11-19 NOTE — PHYSICAL THERAPY INITIAL EVALUATION ADULT - GENERAL OBSERVATIONS, REHAB EVAL
pt received/returned semi-supine in bed +heplock, +tele, +cranial incision C/D/I, +mayer, c/o mayer discomfort

## 2019-11-19 NOTE — PHYSICAL THERAPY INITIAL EVALUATION ADULT - ADDITIONAL COMMENTS
pt states that she lives alone in an elevator access apt building w/ no stairs to enter. States that she uses a RW for ambulation 2/2 knee pain. States that she has had one fall as of recent. No home health aide. States that she was independent in ADLs, sister will assist when she leaves the hospital

## 2019-11-19 NOTE — PHYSICAL THERAPY INITIAL EVALUATION ADULT - GAIT DEVIATIONS NOTED, PT EVAL
decreased weight-shifting ability/decreased kerrie/increased time in double stance/decreased step length

## 2019-11-19 NOTE — PHYSICAL THERAPY INITIAL EVALUATION ADULT - IMPAIRMENTS FOUND, PT EVAL
aerobic capacity/endurance/posture/neuromotor development and sensory integration/fine motor/joint integrity and mobility/gross motor/integumentary integrity/gait, locomotion, and balance/muscle strength

## 2019-11-19 NOTE — OCCUPATIONAL THERAPY INITIAL EVALUATION ADULT - MANUAL MUSCLE TESTING RESULTS, REHAB EVAL
B/L UE 5/5 t/o; smile/cheekpuff/eyebrow elevation intact, tongue in midline/no strength deficits were identified

## 2019-11-19 NOTE — PHYSICAL THERAPY INITIAL EVALUATION ADULT - PLANNED THERAPY INTERVENTIONS, PT EVAL
balance training/postural re-education/strengthening/bed mobility training/transfer training/gait training/neuromuscular re-education

## 2019-11-20 ENCOUNTER — TRANSCRIPTION ENCOUNTER (OUTPATIENT)
Age: 79
End: 2019-11-20

## 2019-11-20 VITALS
HEART RATE: 82 BPM | SYSTOLIC BLOOD PRESSURE: 132 MMHG | OXYGEN SATURATION: 96 % | RESPIRATION RATE: 16 BRPM | DIASTOLIC BLOOD PRESSURE: 95 MMHG

## 2019-11-20 LAB
ANION GAP SERPL CALC-SCNC: 11 MMOL/L — SIGNIFICANT CHANGE UP (ref 5–17)
BUN SERPL-MCNC: 7 MG/DL — SIGNIFICANT CHANGE UP (ref 7–23)
CALCIUM SERPL-MCNC: 9.1 MG/DL — SIGNIFICANT CHANGE UP (ref 8.4–10.5)
CHLORIDE SERPL-SCNC: 108 MMOL/L — SIGNIFICANT CHANGE UP (ref 96–108)
CO2 SERPL-SCNC: 24 MMOL/L — SIGNIFICANT CHANGE UP (ref 22–31)
CREAT SERPL-MCNC: 0.47 MG/DL — LOW (ref 0.5–1.3)
GLUCOSE SERPL-MCNC: 92 MG/DL — SIGNIFICANT CHANGE UP (ref 70–99)
HCT VFR BLD CALC: 35.1 % — SIGNIFICANT CHANGE UP (ref 34.5–45)
HGB BLD-MCNC: 11.2 G/DL — LOW (ref 11.5–15.5)
MAGNESIUM SERPL-MCNC: 2 MG/DL — SIGNIFICANT CHANGE UP (ref 1.6–2.6)
MCHC RBC-ENTMCNC: 30.4 PG — SIGNIFICANT CHANGE UP (ref 27–34)
MCHC RBC-ENTMCNC: 31.9 GM/DL — LOW (ref 32–36)
MCV RBC AUTO: 95.1 FL — SIGNIFICANT CHANGE UP (ref 80–100)
NRBC # BLD: 0 /100 WBCS — SIGNIFICANT CHANGE UP (ref 0–0)
PHOSPHATE SERPL-MCNC: 2.5 MG/DL — SIGNIFICANT CHANGE UP (ref 2.5–4.5)
PLATELET # BLD AUTO: 184 K/UL — SIGNIFICANT CHANGE UP (ref 150–400)
POTASSIUM SERPL-MCNC: 3.8 MMOL/L — SIGNIFICANT CHANGE UP (ref 3.5–5.3)
POTASSIUM SERPL-SCNC: 3.8 MMOL/L — SIGNIFICANT CHANGE UP (ref 3.5–5.3)
RBC # BLD: 3.69 M/UL — LOW (ref 3.8–5.2)
RBC # FLD: 13.1 % — SIGNIFICANT CHANGE UP (ref 10.3–14.5)
SODIUM SERPL-SCNC: 143 MMOL/L — SIGNIFICANT CHANGE UP (ref 135–145)
WBC # BLD: 6.22 K/UL — SIGNIFICANT CHANGE UP (ref 3.8–10.5)
WBC # FLD AUTO: 6.22 K/UL — SIGNIFICANT CHANGE UP (ref 3.8–10.5)

## 2019-11-20 PROCEDURE — 99238 HOSP IP/OBS DSCHRG MGMT 30/<: CPT

## 2019-11-20 PROCEDURE — 99232 SBSQ HOSP IP/OBS MODERATE 35: CPT

## 2019-11-20 RX ORDER — CLOPIDOGREL BISULFATE 75 MG/1
1 TABLET, FILM COATED ORAL
Qty: 30 | Refills: 0
Start: 2019-11-20 | End: 2019-12-19

## 2019-11-20 RX ORDER — OFLOXACIN 0.3 %
1 DROPS OPHTHALMIC (EYE)
Qty: 0 | Refills: 0 | DISCHARGE
Start: 2019-11-20

## 2019-11-20 RX ORDER — TRAMADOL HYDROCHLORIDE 50 MG/1
1 TABLET ORAL
Qty: 21 | Refills: 0
Start: 2019-11-20 | End: 2019-11-26

## 2019-11-20 RX ORDER — SENNA PLUS 8.6 MG/1
2 TABLET ORAL
Qty: 0 | Refills: 0 | DISCHARGE
Start: 2019-11-20

## 2019-11-20 RX ORDER — ASPIRIN/CALCIUM CARB/MAGNESIUM 324 MG
1 TABLET ORAL
Qty: 30 | Refills: 0
Start: 2019-11-20 | End: 2019-12-19

## 2019-11-20 RX ADMIN — CLOPIDOGREL BISULFATE 75 MILLIGRAM(S): 75 TABLET, FILM COATED ORAL at 11:45

## 2019-11-20 RX ADMIN — Medication 1 DROP(S): at 06:00

## 2019-11-20 RX ADMIN — PANTOPRAZOLE SODIUM 40 MILLIGRAM(S): 20 TABLET, DELAYED RELEASE ORAL at 06:00

## 2019-11-20 RX ADMIN — Medication 1 DROP(S): at 11:44

## 2019-11-20 RX ADMIN — Medication 325 MILLIGRAM(S): at 11:45

## 2019-11-20 RX ADMIN — Medication 1 DROP(S): at 00:10

## 2019-11-20 NOTE — DISCHARGE NOTE NURSING/CASE MANAGEMENT/SOCIAL WORK - PATIENT PORTAL LINK FT
You can access the FollowMyHealth Patient Portal offered by [facility] by registering at the following website: [email or web address]. By joining [employer]’s FollowMyHealth portal, you will also be able to view your health information using other applications (apps) compatible with our system.

## 2019-11-20 NOTE — PROGRESS NOTE ADULT - SUBJECTIVE AND OBJECTIVE BOX
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 During her last angio, a microcatheter was caught on the stent so aneurysm repair was held until today. Pt has been on ASA 325mg and Plavix 75mg daily. Her last dose of ASA 325mg was last night. Her aneurysm was originally discovered during workup for HA and visual changes. She reports mild R hearing loss which she has been worked up for by ENT and has been receiving steroid shots in the ear. She denies any current HA or vision changes. (18 Nov 2019 10:58)      Hospital Course:  11/18 – s/p cerebral angiogram for coil emboliztion of ACOMM artery aneurysm. CT Head x2 post procedure for confusion, stable.  11/19 – KIERRA overnight, transferred to ICU for observation. post procedure MRI performed, no strokes.  11/20 -- KIERRA overnight. Neuro stable, MRI with no infarcts. Neurology and ophthalmology consults- f/u recs? Dispo pending home with PT/OT      Vital Signs Last 24 Hrs  T(C): 36.7 (19 Nov 2019 21:29), Max: 37 (19 Nov 2019 05:00)  T(F): 98.1 (19 Nov 2019 21:29), Max: 98.6 (19 Nov 2019 05:00)  HR: 82 (20 Nov 2019 00:13) (74 - 92)  BP: 140/69 (20 Nov 2019 00:13) (101/57 - 154/70)  BP(mean): 101 (19 Nov 2019 19:00) (72 - 111)  RR: 18 (20 Nov 2019 00:13) (14 - 31)  SpO2: 95% (20 Nov 2019 00:13) (93% - 98%)    I&O's Summary    18 Nov 2019 07:01  -  19 Nov 2019 07:00  --------------------------------------------------------  IN: 1700 mL / OUT: 1478 mL / NET: 222 mL    19 Nov 2019 07:01  -  20 Nov 2019 00:47  --------------------------------------------------------  IN: 2099.8 mL / OUT: 2440 mL / NET: -340.2 mL        PHYSICAL EXAM:  Neurological: AOx3, NAD, FC, speech coherent   CN II-XII: EOMI, PERRL, face symmetric  Motor: MAEx4 5/5 UE and LE B/L, no drift   SILT throughout      TUBES/LINES:  [] Calix  [] A-line  [] Lumbar Drain  [] Wound Drains  [] NGT   [] EVD   [] CVC  [] Other      DIET:  [] NPO  [x] Mechanical  [] Tube feeds    LABS:                        10.4   8.38  )-----------( 172      ( 19 Nov 2019 05:58 )             31.8     11-19    139  |  109<H>  |  10  ----------------------------<  99  3.8   |  21<L>  |  0.47<L>    Ca    8.6      19 Nov 2019 05:58  Phos  2.3     11-19  Mg     1.9     11-19      PT/INR - ( 18 Nov 2019 12:51 )   PT: 12.3 sec;   INR: 1.09          PTT - ( 18 Nov 2019 12:51 )  PTT:>200.0 sec        CAPILLARY BLOOD GLUCOSE      POCT Blood Glucose.: 84 mg/dL (19 Nov 2019 16:12)  POCT Blood Glucose.: 132 mg/dL (19 Nov 2019 11:45)  POCT Blood Glucose.: 94 mg/dL (19 Nov 2019 06:20)      Drug Levels: [] N/A    CSF Analysis: [] N/A      Allergies    No Known Allergies    Intolerances        Home Medications:  acetaminophen 325 mg oral tablet: 2 tab(s) orally every 6 hours, As needed, Temp greater or equal to 38.5C (101.3F), Moderate Pain (4 - 6) (12 Oct 2019 15:05)  aspirin 325 mg oral tablet: 1 tab(s) orally once a day (12 Oct 2019 15:10)  rosuvastatin 10 mg oral tablet: 1 tab(s) orally once a day (11 Oct 2019 10:55)      MEDICATIONS:  MEDICATIONS  (STANDING):  aspirin 325 milliGRAM(s) Oral daily  chlorhexidine 4% Liquid 1 Application(s) Topical Once  clopidogrel Tablet 75 milliGRAM(s) Oral daily  dextrose 5%. 1000 milliLiter(s) (50 mL/Hr) IV Continuous <Continuous>  melatonin 2 milliGRAM(s) Oral <User Schedule>  ofloxacin 0.3% Solution 1 Drop(s) Right EYE four times a day  pantoprazole    Tablet 40 milliGRAM(s) Oral before breakfast    MEDICATIONS  (PRN):  acetaminophen   Tablet .. 650 milliGRAM(s) Oral every 6 hours PRN Temp greater or equal to 38C (100.4F), Mild Pain (1 - 3)  bisacodyl 5 milliGRAM(s) Oral daily PRN Constipation  glucagon  Injectable 1 milliGRAM(s) IntraMuscular once PRN Glucose LESS THAN 70 milligrams/deciliter  ondansetron Injectable 4 milliGRAM(s) IV Push every 8 hours PRN Nausea and/or Vomiting  senna 2 Tablet(s) Oral at bedtime PRN Constipation  traMADol 50 milliGRAM(s) Oral every 6 hours PRN Moderate Pain (4 - 6)      CULTURES:      RADIOLOGY & ADDITIONAL TESTS:      ASSESSMENT:  79 year old female with HLD, CAD s/p cardiac stents with previously coiled ACOMM artery aneurysm s/p cerebral angiogram for ACOMM stent placement 10/11/19, now s/p cerebral angiogram for coil embolization of ACOMM artery aneurysm 11/18.      PLAN:  -neuro/vital checks  -pain control prn  -continue  and Plavix 75  -CTH performed, MRI performed- all stable  -f/u recs per neurology and ophthalmology   -SBP normotensive  -regular diet, bowel regimen  -GI ppx, SCDs  -OOB/PT/OT  -pending discharge home with home PT and home OT  -d/w Dr. Lowe       Assessment: present when checked     [] GCS   E   V   M     Heart Failure: [] Acute, [] acute on chronic, [] chronic   Heart Failure: [] Diastolic (HFpEF), [] Systolic (HRrEF), [] Combined (HFpEF and HFrEF), [] RHF, [] Pulm HTN, [] Other     [] SEBASTIÁN, [] ATN, [] AIN, [] other   [] CKD1, [] CKD2, [] CKD3, [] CKD4, [] CKD5, [] ESRD     Encephalopathy: [] Metabolic, [] Hepatic, [] Toxic, [] Neurological, [] Other     Abnormal Nutritional Status: [] malnutrition (see nutrition note), []underweight: BMI <19, [] morbid obesity: BMI >40, [] Cachexia     [] Sepsis   [] Hypovolemic shock, [] Cardiogenic shock, [] Hemorrhagic shock, [] Neurogenic shock   [] Acute respiratory failure   [] Cerebral edema, [] Brain compression / herniation   [] Functional quadriplegia   [] Acute blood loss anemia

## 2019-11-20 NOTE — SPEECH LANGUAGE PATHOLOGY EVALUATION - COMMENTS
78 y/o F PMH ACOMM aneurysm coiling x2, stent placement on 10/11/2019, HLD, CAD s/p stents presents for angio for aneurysm recanalization repair. During her last angio, a microcatheter was caught on the stent so aneurysm repair was held until today. Pt has been on ASA 325mg and Plavix 75mg daily. Her last dose of ASA 325mg was last night. Her aneurysm was originally discovered during workup for HA and visual changes. She reports mild R hearing loss which she has been worked up for by ENT and has been receiving steroid shots in the ear. She denies any current HA or vision changes. (18 Nov 2019 10:58) 1) If there are any further concerns with pt's speech-language after d/c from [facility], pt was recommended to seek an out-pt speech-language evaluation. If pt chooses to return to our [facility] as an out-pt, she may call [phone] to make an appointment.   2) No further f/u is warranted at this time. This service is s/o. Reconsult PRN. Pt was able to answer a variety of open-ended questions appropriately during conversation. For example, when asked what she did for work, she replied, "I'm retired. 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 She commented, "Those were good times." During conversation, pt was able to construct semantically and syntactically accurate sentences. She provided appropriate levels of detail in her statements with appropriate pragmatics. Unable to assess reading/writing at this time 2/2 pt stating that she did not have her glasses in the hospital.

## 2019-11-20 NOTE — SPEECH LANGUAGE PATHOLOGY EVALUATION - SLP DIAGNOSIS
Pt p/w functional receptive and expressive language skills c/w pt reports during this evaluation. Pt demonstrated an ability to answer a variety of open/closed-ended questions as well as construct syntactially complex sentences on the conversational level. Speech was also judged to be fully intelligible and precise. Given these observations, therapy is not indicated at this time.

## 2019-11-20 NOTE — PROGRESS NOTE ADULT - SUBJECTIVE AND OBJECTIVE BOX
Allergies: No Known Allergies          VITALS:  T(C): 36.9 (11-20-19 @ 04:49), Max: 36.9 (11-20-19 @ 04:49)  HR: 82 (11-20-19 @ 11:49) (76 - 90)  BP: 132/95 (11-20-19 @ 11:49) (122/64 - 154/70)  RR: 16 (11-20-19 @ 11:49) (14 - 31)  SpO2: 96% (11-20-19 @ 11:49) (91% - 98%)    EXAM:        MEDICATIONS:  acetaminophen   Tablet .. 650 milliGRAM(s) Oral every 6 hours PRN  aspirin 325 milliGRAM(s) Oral daily  bisacodyl 5 milliGRAM(s) Oral daily PRN  chlorhexidine 4% Liquid 1 Application(s) Topical Once  clopidogrel Tablet 75 milliGRAM(s) Oral daily  dextrose 5%. 1000 milliLiter(s) IV Continuous <Continuous>  glucagon  Injectable 1 milliGRAM(s) IntraMuscular once PRN  melatonin 2 milliGRAM(s) Oral <User Schedule>  ofloxacin 0.3% Solution 1 Drop(s) Right EYE four times a day  ondansetron Injectable 4 milliGRAM(s) IV Push every 8 hours PRN  pantoprazole    Tablet 40 milliGRAM(s) Oral before breakfast  senna 2 Tablet(s) Oral at bedtime PRN  traMADol 50 milliGRAM(s) Oral every 6 hours PRN      I/O's    11-19-19 @ 07:01  -  11-20-19 @ 07:00  --------------------------------------------------------  IN: 2449.8 mL / OUT: 3040 mL / NET: -590.2 mL    11-20-19 @ 07:01  -  11-20-19 @ 13:20  --------------------------------------------------------  IN: 0 mL / OUT: 350 mL / NET: -350 mL        Ventilator data:        LABS:                          11.2   6.22  )-----------( 184      ( 20 Nov 2019 05:47 )             35.1     11-20    143  |  108  |  7   ----------------------------<  92  3.8   |  24  |  0.47<L>    Ca    9.1      20 Nov 2019 05:47  Phos  2.5     11-20  Mg     2.0     11-20              CAPILLARY BLOOD GLUCOSE      POCT Blood Glucose.: 84 mg/dL (19 Nov 2019 16:12)        79/F y/o F PMH ACOMM aneurysm coiling x2, s/p stent placement on 10/11/2019, now admitted for angio for aneurysm recanalization repair.    Pt has been on ASA 325mg and Plavix 75mg daily. Her last dose of ASA 325mg was last night.  Neurologically intact at baseline other than mild hearing loss.    Post op, with new right eye pain and some headache. Taken for CTH which was without focal findings.    PMH: above plus CAD s/p PCI, HLD      Allergies: No Known Allergies    11/19: patient with notable word-finding difficulty post-op continued to this morning.  urgent MRI negative for stroke, no vessel imaging performed.      24h events: slept well, no acute events      Allergies: No Known Allergies        EXAM:  Gen: elderly woman in NAD    MSE: awake, alert, oriented x3, language fluent this AM with normal expression and word-finding.   CN: PHILLY, EOMI, VFF, face symmetric, TUP midline, no dysarthria  Motor: no pronator drift, full strength to confrontation in all extremities  Sensory: intact to LT throughout

## 2019-11-20 NOTE — SPEECH LANGUAGE PATHOLOGY EVALUATION - SLP GENERAL OBSERVATIONS
Pt was received awake/alert, seated in bed, feeding herself from breakfast tray. Pt denied having any difficulties with her speech-language at this time.

## 2019-11-20 NOTE — PROGRESS NOTE ADULT - REASON FOR ADMISSION
angio for aneurysm coil

## 2019-11-21 ENCOUNTER — INBOUND DOCUMENT (OUTPATIENT)
Age: 79
End: 2019-11-21

## 2019-12-05 ENCOUNTER — APPOINTMENT (OUTPATIENT)
Dept: NEUROSURGERY | Facility: CLINIC | Age: 79
End: 2019-12-05
Payer: MEDICARE

## 2019-12-05 VITALS
HEART RATE: 89 BPM | BODY MASS INDEX: 26.68 KG/M2 | WEIGHT: 145 LBS | HEIGHT: 62 IN | SYSTOLIC BLOOD PRESSURE: 105 MMHG | DIASTOLIC BLOOD PRESSURE: 73 MMHG | RESPIRATION RATE: 16 BRPM | OXYGEN SATURATION: 97 %

## 2019-12-05 PROCEDURE — 99215 OFFICE O/P EST HI 40 MIN: CPT

## 2019-12-05 PROCEDURE — 70496 CT ANGIOGRAPHY HEAD: CPT

## 2019-12-10 NOTE — PHYSICAL EXAM
[General Appearance - Alert] : alert [General Appearance - In No Acute Distress] : in no acute distress [Oriented To Time, Place, And Person] : oriented to person, place, and time [Affect] : the affect was normal [Impaired Insight] : insight and judgment were intact [Place] : oriented to place [Person] : oriented to person [Short Term Intact] : short term memory intact [Time] : oriented to time [Remote Intact] : remote memory intact [Span Intact] : the attention span was normal [Concentration Intact] : normal concentrating ability [Fluency] : fluency intact [Comprehension] : comprehension intact [Current Events] : adequate knowledge of current events [Past History] : adequate knowledge of personal past history [Cranial Nerves Optic (II)] : visual acuity intact bilaterally,  pupils equal round and reactive to light [Vocabulary] : adequate range of vocabulary [Cranial Nerves Oculomotor (III)] : extraocular motion intact [Cranial Nerves Trigeminal (V)] : facial sensation intact symmetrically [Cranial Nerves Facial (VII)] : face symmetrical [Cranial Nerves Vestibulocochlear (VIII)] : hearing was intact bilaterally [Cranial Nerves Glossopharyngeal (IX)] : tongue and palate midline [Cranial Nerves Hypoglossal (XII)] : there was no tongue deviation with protrusion [Cranial Nerves Accessory (XI - Cranial And Spinal)] : head turning and shoulder shrug symmetric [Motor Strength] : muscle strength was normal in all four extremities [No Muscle Atrophy] : normal bulk in all four extremities [Sensation Tactile Decrease] : light touch was intact [Balance] : balance was intact [2+] : Patella left 2+ [No Visual Abnormalities] : no visible abnormailities [No Tenderness to Palpation] : no spine tenderness on palpation [Full ROM] : full ROM [No Pain with ROM] : no pain with motion in any direction [Normal] : normal [Intact] : all reflexes within normal limits bilaterally [Sclera] : the sclera and conjunctiva were normal [PERRL With Normal Accommodation] : pupils were equal in size, round, reactive to light, with normal accommodation [Extraocular Movements] : extraocular movements were intact [Outer Ear] : the ears and nose were normal in appearance [Neck Appearance] : the appearance of the neck was normal [Neck Cervical Mass (___cm)] : no neck mass was observed [Oropharynx] : the oropharynx was normal [Jugular Venous Distention Increased] : there was no jugular-venous distention [Thyroid Diffuse Enlargement] : the thyroid was not enlarged [Thyroid Nodule] : there were no palpable thyroid nodules [Heart Rate And Rhythm] : heart rate was normal and rhythm regular [Auscultation Breath Sounds / Voice Sounds] : lungs were clear to auscultation bilaterally [Heart Sounds Gallop] : no gallops [Heart Sounds] : normal S1 and S2 [Murmurs] : no murmurs [Heart Sounds Pericardial Friction Rub] : no pericardial rub [Full Pulse] : the pedal pulses are present [Bowel Sounds] : normal bowel sounds [Edema] : there was no peripheral edema [Abdomen Tenderness] : non-tender [Abdomen Soft] : soft [No CVA Tenderness] : no ~M costovertebral angle tenderness [Abdomen Mass (___ Cm)] : no abdominal mass palpated [No Spinal Tenderness] : no spinal tenderness [Abnormal Walk] : normal gait [Nail Clubbing] : no clubbing  or cyanosis of the fingernails [Skin Color & Pigmentation] : normal skin color and pigmentation [Motor Tone] : muscle strength and tone were normal [Musculoskeletal - Swelling] : no joint swelling seen [Skin Turgor] : normal skin turgor [] : no rash [Past-pointing] : there was no past-pointing [Tremor] : no tremor present [L'Hermitte's] : neck flexion did not produce tingling down the spine/arms [Spurling's - Opposite Side] : Negative Spurling's on opposite side [Straight-Leg Raise Test - Right] : straight leg raise  of the right leg was negative [Spurling's Same Side] : Negative Spurling's on same side [Straight-Leg Raise Test - Left] : straight leg raise of the left leg was negative

## 2019-12-10 NOTE — RESULTS/DATA
[FreeTextEntry1] : Ms Quigley his here for follow up exam  Doing well   Right femoral puncture site CDI  no hematoma noted + peripheal pulses\par Pt doing well   Continue ASA Pklavix daily and and then March you should go back and take Plavix QOD and ASA QOD alternating as per Dr Lowe    Pt will come back for a follow up angiogram in May 2020

## 2019-12-10 NOTE — REASON FOR VISIT
[Follow-Up: _____] : a [unfilled] follow-up visit [FreeTextEntry1] : Reason For Visit\par CHILO GUERRERO is a 79 year old female being seen for a follow-up visit. \par \par S/P Endovascular Embolization and placement of Stent of cerebral aneurysm on 10/11/19.\par She reports that She is doing okay. She is discouraged by the ecchymotic areas over there Left breast and Right Groin Ecchymosis. NO HEMATOMA at either site. +2 Pulses bilaterally.\par She is doing well with no evidence of stroke syndrome. \par \par Pt underwent 11/2019 Cerebral angiogram and coiling of   POst op no heamtoma  Doing well\par MRI post done because of anxiety which contributed to word searching  MRI looked good\par \par Pt here for a follow up exam\par \par Pt neurologically stable   Doing well Will follow up yearly   Gave pt Healthplay back and imaging of her MRi\par \par Also prescribed outpt PT therapy as per pts request\par \par Will Need follow up cerebral angio may or June  Gave pt clearance paper work to be filled out by her Primary MD prior to Cerebral angiogram

## 2019-12-27 PROCEDURE — 86901 BLOOD TYPING SEROLOGIC RH(D): CPT

## 2019-12-27 PROCEDURE — C1894: CPT

## 2019-12-27 PROCEDURE — 86900 BLOOD TYPING SEROLOGIC ABO: CPT

## 2019-12-27 PROCEDURE — C1757: CPT

## 2019-12-27 PROCEDURE — 70551 MRI BRAIN STEM W/O DYE: CPT

## 2019-12-27 PROCEDURE — C1889: CPT

## 2019-12-27 PROCEDURE — 85576 BLOOD PLATELET AGGREGATION: CPT

## 2019-12-27 PROCEDURE — 85730 THROMBOPLASTIN TIME PARTIAL: CPT

## 2019-12-27 PROCEDURE — 97161 PT EVAL LOW COMPLEX 20 MIN: CPT

## 2019-12-27 PROCEDURE — 82962 GLUCOSE BLOOD TEST: CPT

## 2019-12-27 PROCEDURE — C1887: CPT

## 2019-12-27 PROCEDURE — 85610 PROTHROMBIN TIME: CPT

## 2019-12-27 PROCEDURE — 80048 BASIC METABOLIC PNL TOTAL CA: CPT

## 2019-12-27 PROCEDURE — 92523 SPEECH SOUND LANG COMPREHEN: CPT

## 2019-12-27 PROCEDURE — 97162 PT EVAL MOD COMPLEX 30 MIN: CPT

## 2019-12-27 PROCEDURE — C1769: CPT

## 2019-12-27 PROCEDURE — 83735 ASSAY OF MAGNESIUM: CPT

## 2019-12-27 PROCEDURE — C9399: CPT

## 2019-12-27 PROCEDURE — 85027 COMPLETE CBC AUTOMATED: CPT

## 2019-12-27 PROCEDURE — 36415 COLL VENOUS BLD VENIPUNCTURE: CPT

## 2019-12-27 PROCEDURE — 84100 ASSAY OF PHOSPHORUS: CPT

## 2019-12-27 PROCEDURE — 70450 CT HEAD/BRAIN W/O DYE: CPT

## 2019-12-27 PROCEDURE — 86850 RBC ANTIBODY SCREEN: CPT

## 2019-12-27 PROCEDURE — C1760: CPT

## 2020-07-16 ENCOUNTER — APPOINTMENT (OUTPATIENT)
Dept: NEUROSURGERY | Facility: CLINIC | Age: 80
End: 2020-07-16
Payer: MEDICARE

## 2020-07-16 PROCEDURE — 99213 OFFICE O/P EST LOW 20 MIN: CPT

## 2020-07-20 NOTE — PHYSICAL EXAM
[General Appearance - Alert] : alert [Oriented To Time, Place, And Person] : oriented to person, place, and time [General Appearance - In No Acute Distress] : in no acute distress

## 2020-07-21 NOTE — PROGRESS NOTE ADULT - ASSESSMENT
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  Post op with some right eye pain and inattention.  Some post-procedure wordfinding difficulty but resolved after 24h, and MRI negative for acute infarct.  Now doing well.    Neuro  -q2h neuro checks  -ophtho consult per neurosurgery for eye - topical ofloxacin for right eye  -cont. melatonin QHS 2mg for sleep cycle readjustment    CV:  -cont ASA 81, plavix 75  -SBP<160     Pulm: NOEMI    GI - regular diet    PT/Ot dispo likely home 36.7

## 2020-07-30 ENCOUNTER — APPOINTMENT (OUTPATIENT)
Dept: NEUROSURGERY | Facility: CLINIC | Age: 80
End: 2020-07-30
Payer: MEDICARE

## 2020-07-30 PROCEDURE — 99213 OFFICE O/P EST LOW 20 MIN: CPT

## 2020-08-03 NOTE — REVIEW OF SYSTEMS
[As Noted in HPI] : as noted in HPI [Negative] : Respiratory [de-identified] : heachaches, malaise

## 2020-08-03 NOTE — ASSESSMENT
[FreeTextEntry1] : Plan:\par - All imaging reviewed, Cerebral Aneurysm is stable at this time.\par - Patient instructed to continue with current ASA/Plavix regimen of QOD, we will assess whether she can stop one or both after repeat Angiogram.\par - Patient is still in [place], however, tentative Angiogram scheduled for November 9, 2020 but she is also encouraged to obtain while living in [place] if she wishes to proceed now and send results to our office to review.\par - Encouraged to see her PCP for additional testing as current malaise and headaches are not related to her aneurysm.\par \par Patient verbalized understanding and agreement with current plan.

## 2020-08-03 NOTE — DATA REVIEWED
[de-identified] : [facility]\par BRAIN W/CONTRAST 6/24/2020 IMPRESSION:\par 1. Postsurgical changes in the area of the anterior communicating artery.\par 2. No significant recannulization of the presumed coiled aneurysm about the anterior communicating artery is identified.\par 3. Artifact noted in the proximal right A1 segment is probably due to metalliz stent. \par  \par

## 2020-08-03 NOTE — REASON FOR VISIT
[Follow-Up: _____] : a [unfilled] follow-up visit [FreeTextEntry1] : \par TODAY'S VISIT:\par Patient returns with additional questions regarding newest MRA. She continues to have persistent headaches, was priorly educated that headaches are unlikely related to treated aneurysm and she should follow up with her PCP for further testing. She is scheduled for a repeat Angiogram on 11/9/2020.\par \par PRIOR VISIT 7/16/2020:\par Patient returns with new MRA Brain to review and discuss treatment options. She continues to have headaches, she has not had repeat Angiogram as previously discussed.\par \par PRIOR VISIT 12/5/19:\par She reports that She is doing okay. She is discouraged by the ecchymotic areas over there Left breast and Right Groin Ecchymosis. NO HEMATOMA at either site. +2 Pulses bilaterally.\par She is doing well with no evidence of stroke syndrome. \par \par Pt underwent 11/2019 Cerebral angiogram and coiling of POst op no heamtoma Doing well\par MRI post done because of anxiety which contributed to word searching MRI looked good\par \par Pt here for a follow up exam\par \par Pt neurologically stable Doing well Will follow up yearly Gave pt Healthplay back and imaging of her MRi\par \par Also prescribed outpt PT therapy as per pts request\par \par Will Need follow up cerebral angio may or June Gave pt clearance paper work to be filled out by her Primary MD prior to Cerebral angiogram \par \par

## 2020-08-03 NOTE — HISTORY OF PRESENT ILLNESS
[Home] : at home, [unfilled] , at the time of the visit. [Medical Office: ([facility])___] : at the medical office located in  [Verbal consent obtained from patient] : the patient, [unfilled] [FreeTextEntry1] : \par \par This is a 79 year old woman here for comprehensive evaluations of previously coiled UNRUPTURED anterior communicating artery aneurysm at outside facility at [facility] by Dr Oh. She comes to visit with her friend. She reports that her initial symptoms started with visual difficulty in 2016. She underwent comprehensive diagnostic workup that showed a diagnostic cerebral aneurysm. She underwent endovascular Coiled embolization of cerebral aneurysm in 2016 and then a repeat procedure in 2017. She reports that she fell 6-8 weeks ago and started to experience head pressure. She follow up with Dr. Oh and Head CT and NEW MRA head ordered to evaluate. No intracranial hemorrhage. She denies any gross neurological deficits. \par \par

## 2020-10-27 NOTE — REASON FOR VISIT
[Follow-Up: _____] : a [unfilled] follow-up visit [FreeTextEntry1] : TODAY'S VISIT:\par Patient returns with new MRA Brain to review and discuss treatment options. She continues to have headaches, she has not had repeat Angiogram as previously discussed.\par \par PRIOR VISIT 12/5/19:\par She reports that She is doing okay. She is discouraged by the ecchymotic areas over there Left breast and Right Groin Ecchymosis. NO HEMATOMA at either site. +2 Pulses bilaterally.\par She is doing well with no evidence of stroke syndrome. \par \par Pt underwent 11/2019 Cerebral angiogram and coiling of POst op no heamtoma Doing well\par MRI post done because of anxiety which contributed to word searching MRI looked good\par \par Pt here for a follow up exam\par \par Pt neurologically stable Doing well Will follow up yearly Gave pt Healthplay back and imaging of her MRi\par \par Also prescribed outpt PT therapy as per pts request\par \par Will Need follow up cerebral angio may or June Gave pt clearance paper work to be filled out by her Primary MD prior to Cerebral angiogram \par

## 2020-10-27 NOTE — ASSESSMENT
[FreeTextEntry1] : Plan:\par - All imaging reviewed by Dr. Lowe with no noted changes since prior study.\par - Explained to patient that persistent headaches are not likely associated with her stented aneurysm and she should see her PCP for further workup. All questions answered. \par - Educated patient on importance of obtaining a repeat Angiogram to ensure that aneurysm has not re-occured. Tentative date of 11/9/2020 agreed upon. I will give patient further instructions as the date approaches.\par - She has another follow-up scheduled with Dr. Lowe.\par \par

## 2020-10-27 NOTE — DATA REVIEWED
[de-identified] : [facility]\par BRAIN W/CONTRAST 6/24/2020 IMPRESSION:\par 1. Postsurgical changes in the area of the anterior communicating artery.\par 2. No significant recannulization of the presumed coiled aneurysm about the anterior communicating artery is identified.\par 3. Artifact noted in the proximal right A1 segment is probably due to metalliz stent.

## 2020-12-14 NOTE — PROVIDER CONTACT NOTE (CHANGE IN STATUS NOTIFICATION) - DATE AND TIME:
18-Nov-2019 19:00 15 YO F sp spinal fusion, surgery consulted for post-op ileus    - NPO, bowel rest  - NGT if patient is vomiting  - Care per primary team (ortho)  - Will continue to follow    Pediatric Surgery [id number] 17 YO F sp spinal fusion, surgery consulted for post-op ileus, now s/p multiple bowel movements    - please obtain repeat portable abdominal x-ray   - NPO, bowel rest  - NGT if patient is vomiting  - Care per primary team (ortho)  - Will continue to follow    Pediatric Surgery [id number]

## 2020-12-16 NOTE — H&P ADULT - NSHPPOASURGSITEINCISION_GEN_ALL_CORE
Head,  normocephalic,  atraumatic,  Face,  Face within normal limits, Nose/Nasopharynx,  External nose  normal appearance,  no nasal discharge,  Mouth and Throat,  Oral cavity appearance normal Lips,  Appearance normal no

## 2021-05-17 NOTE — PATIENT PROFILE ADULT - HOME ACCESSIBILITY CONCERNS
[FreeTextEntry1] : annual  [de-identified] : annual exam well active regular exercise healthy diet non smoker gyn mammo and colonoscopy utd history of hypothyroid migraines controlled on current medications denies chest pain palpitations or dyspnea  none

## 2022-11-03 ENCOUNTER — APPOINTMENT (OUTPATIENT)
Dept: NEUROSURGERY | Facility: CLINIC | Age: 82
End: 2022-11-03

## 2022-11-03 DIAGNOSIS — I67.1 CEREBRAL ANEURYSM, NONRUPTURED: ICD-10-CM

## 2022-11-03 PROCEDURE — 99212 OFFICE O/P EST SF 10 MIN: CPT | Mod: 95

## 2022-11-03 PROCEDURE — 99202 OFFICE O/P NEW SF 15 MIN: CPT | Mod: 95

## 2022-11-03 NOTE — PHYSICAL EXAM
[Impaired Insight] : insight and judgment were intact [Affect] : the affect was normal [Person] : oriented to person [Place] : oriented to place [Time] : oriented to time [Short Term Intact] : short term memory intact [Remote Intact] : remote memory intact [Span Intact] : the attention span was normal [Concentration Intact] : normal concentrating ability [Fluency] : fluency intact [Comprehension] : comprehension intact [Current Events] : adequate knowledge of current events [Past History] : adequate knowledge of personal past history [Vocabulary] : adequate range of vocabulary [Cranial Nerves Optic (II)] : visual acuity intact bilaterally,  pupils equal round and reactive to light [Cranial Nerves Oculomotor (III)] : extraocular motion intact [Cranial Nerves Trigeminal (V)] : facial sensation intact symmetrically [Cranial Nerves Facial (VII)] : face symmetrical [Cranial Nerves Vestibulocochlear (VIII)] : hearing was intact bilaterally [Cranial Nerves Glossopharyngeal (IX)] : tongue and palate midline [Cranial Nerves Accessory (XI - Cranial And Spinal)] : head turning and shoulder shrug symmetric [Cranial Nerves Hypoglossal (XII)] : there was no tongue deviation with protrusion [Motor Tone] : muscle tone was normal in all four extremities [Motor Strength] : muscle strength was normal in all four extremities [No Muscle Atrophy] : normal bulk in all four extremities [Sensation Tactile Decrease] : light touch was intact [Abnormal Walk] : normal gait [Balance] : balance was intact [2+] : Patella left 2+ [No Visual Abnormalities] : no visible abnormailities [No Tenderness to Palpation] : no spine tenderness on palpation [Full ROM] : full ROM [No Pain with ROM] : no pain with motion in any direction [Normal] : normal [Intact] : all reflexes within normal limits bilaterally [Outer Ear] : the ears and nose were normal in appearance [Oropharynx] : the oropharynx was normal [Neck Appearance] : the appearance of the neck was normal [Neck Cervical Mass (___cm)] : no neck mass was observed [Jugular Venous Distention Increased] : there was no jugular-venous distention [Thyroid Diffuse Enlargement] : the thyroid was not enlarged [Thyroid Nodule] : there were no palpable thyroid nodules [Auscultation Breath Sounds / Voice Sounds] : lungs were clear to auscultation bilaterally [Full Pulse] : the pedal pulses are present [Edema] : there was no peripheral edema [Bowel Sounds] : normal bowel sounds [Abdomen Soft] : soft [Abdomen Tenderness] : non-tender [Abdomen Mass (___ Cm)] : no abdominal mass palpated [No CVA Tenderness] : no ~M costovertebral angle tenderness [No Spinal Tenderness] : no spinal tenderness [Skin Color & Pigmentation] : normal skin color and pigmentation [Skin Turgor] : normal skin turgor [] : no rash [Past-pointing] : there was no past-pointing [Tremor] : no tremor present [L'Hermitte's] : neck flexion did not produce tingling down the spine/arms [Spurling's - Opposite Side] : Negative Spurling's on opposite side [Spurling's Same Side] : Negative Spurling's on same side [Straight-Leg Raise Test - Left] : straight leg raise of the left leg was negative [Straight-Leg Raise Test - Right] : straight leg raise  of the right leg was negative

## 2022-11-03 NOTE — ASSESSMENT
[FreeTextEntry1] : Plan:\par Reviewed images \par MRA next year\par Continue current medial regime\par Headaches she is experiencing is not related to anurysm\par Follow up with PCP\par i\par \par Patient verbalized understanding and agreement with current plan.

## 2022-11-03 NOTE — REASON FOR VISIT
[Home] : at home, [unfilled] , at the time of the visit. [Medical Office: ([facility])___] : at the medical office located in  [Patient] : the patient [FreeTextEntry1] : \par Todays visit\par \par 11/3 to review annual MRA\par \par PRIOR VISITS:\par Patient returns with additional questions regarding newest MRA. She continues to have persistent headaches, was priorly educated that headaches are unlikely related to treated aneurysm and she should follow up with her PCP for further testing. She is scheduled for a repeat Angiogram on 11/9/2020.\par \par PRIOR VISIT 7/16/2020:\par Patient returns with new MRA Brain to review and discuss treatment options. She continues to have headaches, she has not had repeat Angiogram as previously discussed.\par \par PRIOR VISIT 12/5/19:\par She reports that She is doing okay. She is discouraged by the ecchymotic areas over there Left breast and Right Groin Ecchymosis. NO HEMATOMA at either site. +2 Pulses bilaterally.\par She is doing well with no evidence of stroke syndrome. \par \par Pt underwent 11/2019 Cerebral angiogram and coiling of POst op no heamtoma Doing well\par MRI post done because of anxiety which contributed to word searching MRI looked good\par \par Pt here for a follow up exam\par \par Pt neurologically stable Doing well Will follow up yearly Gave pt Healthplay back and imaging of her MRi\par \par Also prescribed outpt PT therapy as per pts request\par \par Will Need follow up cerebral angio may or June Gave pt clearance paper work to be filled out by her Primary MD prior to Cerebral angiogram \par \par

## 2022-11-03 NOTE — DATA REVIEWED
[de-identified] : 10/14/2022 DANIEL aneurysm coil mass without evidence fro aneurysm recannulization. Interval pipeline stent embolization of the left A1,right A2 segments with diminution of flow related enhancement within these vessels  No new intracranial aneurysm

## 2022-11-03 NOTE — HISTORY OF PRESENT ILLNESS
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 She comes to visit with her friend. She reports that her initial symptoms started with visual difficulty in 2016. She underwent comprehensive diagnostic workup that showed a diagnostic cerebral aneurysm. She underwent endovascular Coiled embolization of cerebral aneurysm in 2016 and then a repeat procedure in 2017. She reports that she fell 6-8 weeks ago and started to experience head pressure. She follow up with Dr. Oh and Head CT and NEW MRA head ordered to evaluate. No intracranial hemorrhage. She denies any gross neurological deficits. \par \par

## 2023-05-17 ENCOUNTER — APPOINTMENT (RX ONLY)
Dept: URBAN - METROPOLITAN AREA CLINIC 102 | Facility: CLINIC | Age: 83
Setting detail: DERMATOLOGY
End: 2023-05-17

## 2023-05-17 DIAGNOSIS — Z41.9 ENCOUNTER FOR PROCEDURE FOR PURPOSES OTHER THAN REMEDYING HEALTH STATE, UNSPECIFIED: ICD-10-CM

## 2023-05-17 DIAGNOSIS — L65.8 OTHER SPECIFIED NONSCARRING HAIR LOSS: ICD-10-CM

## 2023-05-17 DIAGNOSIS — L57.0 ACTINIC KERATOSIS: ICD-10-CM

## 2023-05-17 DIAGNOSIS — I78.8 OTHER DISEASES OF CAPILLARIES: ICD-10-CM

## 2023-05-17 DIAGNOSIS — D49.2 NEOPLASM OF UNSPECIFIED BEHAVIOR OF BONE, SOFT TISSUE, AND SKIN: ICD-10-CM

## 2023-05-17 PROCEDURE — ? COUNSELING

## 2023-05-17 PROCEDURE — ? ADDITIONAL NOTES

## 2023-05-17 PROCEDURE — ? RECOMMENDATIONS

## 2023-05-17 PROCEDURE — 11102 TANGNTL BX SKIN SINGLE LES: CPT

## 2023-05-17 PROCEDURE — ? PRESCRIPTION MEDICATION MANAGEMENT

## 2023-05-17 PROCEDURE — ? BIOPSY BY SHAVE METHOD

## 2023-05-17 PROCEDURE — ? PRESCRIPTION

## 2023-05-17 PROCEDURE — 99204 OFFICE O/P NEW MOD 45 MIN: CPT | Mod: 25

## 2023-05-17 RX ORDER — FLUOROURACIL 5 MG/G
CREAM TOPICAL BID
Qty: 40 | Refills: 1 | Status: ERX | COMMUNITY
Start: 2023-05-17

## 2023-05-17 RX ADMIN — FLUOROURACIL: 5 CREAM TOPICAL at 00:00

## 2023-05-17 ASSESSMENT — LOCATION SIMPLE DESCRIPTION DERM
LOCATION SIMPLE: RIGHT CHEEK
LOCATION SIMPLE: LEFT SCALP
LOCATION SIMPLE: RIGHT KNEE
LOCATION SIMPLE: LEFT CHEEK
LOCATION SIMPLE: NOSE
LOCATION SIMPLE: ANTERIOR SCALP

## 2023-05-17 ASSESSMENT — LOCATION ZONE DERM
LOCATION ZONE: SCALP
LOCATION ZONE: LEG
LOCATION ZONE: FACE
LOCATION ZONE: NOSE

## 2023-05-17 ASSESSMENT — LOCATION DETAILED DESCRIPTION DERM
LOCATION DETAILED: RIGHT SUPERIOR MEDIAL MALAR CHEEK
LOCATION DETAILED: MID-FRONTAL SCALP
LOCATION DETAILED: LEFT MEDIAL FRONTAL SCALP
LOCATION DETAILED: RIGHT KNEE
LOCATION DETAILED: LEFT SUPERIOR CENTRAL MALAR CHEEK
LOCATION DETAILED: NASAL SUPRATIP
LOCATION DETAILED: LEFT INFERIOR CENTRAL MALAR CHEEK

## 2023-05-17 NOTE — PROCEDURE: COUNSELING
Detail Level: Simple
Patient Specific Counseling (Will Not Stick From Patient To Patient): Patient to ask her cardiologist about starting minoxidil 1.25 mg daily.
Detail Level: Detailed

## 2023-05-17 NOTE — PROCEDURE: PRESCRIPTION MEDICATION MANAGEMENT
Detail Level: Zone
Render In Strict Bullet Format?: No
Plan: Rx minoxidil 1.25 mg pending cardiology approval.

## 2023-05-17 NOTE — PROCEDURE: RECOMMENDATIONS
Recommendations (Free Text): Recommend VBeam laser treatment
Recommendation Preamble: The following recommendations were made during the visit:
Detail Level: Zone
Render Risk Assessment In Note?: no

## 2023-05-17 NOTE — PROCEDURE: ADDITIONAL NOTES
Render Risk Assessment In Note?: no
Additional Notes: Discussed with patient I will review the literature and let her know if I find any new treatment options for sculpture nodules. Recommend consult with Dr. Ivone Oliva.
Detail Level: Simple

## 2023-05-17 NOTE — PROCEDURE: BIOPSY BY SHAVE METHOD
Detail Level: Detailed
Depth Of Biopsy: dermis
Was A Bandage Applied: Yes
Size Of Lesion In Cm: 0.5
X Size Of Lesion In Cm: 0
Biopsy Type: H and E
Biopsy Method: sterile single edge surgical blade
Anesthesia Type: 1% lidocaine without epinephrine
Hemostasis: Electrocautery and Aluminum Chloride
Wound Care: Aquaphor
Dressing: bandage
Destruction After The Procedure: No
Type Of Destruction Used: Curettage
Curettage Text: The wound bed was treated with curettage after the biopsy was performed.
Cryotherapy Text: The wound bed was treated with cryotherapy after the biopsy was performed.
Electrodesiccation Text: The wound bed was treated with electrodesiccation after the biopsy was performed.
Electrodesiccation And Curettage Text: The wound bed was treated with electrodesiccation and curettage after the biopsy was performed.
Silver Nitrate Text: The wound bed was treated with silver nitrate after the biopsy was performed.
Lab: -[id number]
Consent was obtained and risks, benefits, and alternatives were reviewed.
Post-Care Instructions: Patient is to keep the biopsy covered with Vaseline/Aquaphor and cover with bandage daily until healed. Call the office if any concerns about healing process.
Notification Instructions: Patient will be notified of biopsy results.
Billing Type: [employer]
Information: Selecting Yes will display possible errors in your note based on the variables you have selected. This validation is only offered as a suggestion for you. PLEASE NOTE THAT THE VALIDATION TEXT WILL BE REMOVED WHEN YOU FINALIZE YOUR NOTE. IF YOU WANT TO FAX A PRELIMINARY NOTE YOU WILL NEED TO TOGGLE THIS TO 'NO' IF YOU DO NOT WANT IT IN YOUR FAXED NOTE.

## 2023-05-17 NOTE — H&P ADULT - CLICK TO LAUNCH ORM
. Assistance OOB with selected safe patient handling equipment if applicable/Assistance with ambulation/Communicate risk of Fall with Harm to all staff, patient, and family/Monitor gait and stability/Provide patient with walking aids/Provide visual cue: red socks, yellow wristband, yellow gown, etc/Reinforce activity limits and safety measures with patient and family/Bed in lowest position, wheels locked, appropriate side rails in place/Call bell, personal items and telephone in reach/Instruct patient to call for assistance before getting out of bed/chair/stretcher/Non-slip footwear applied when patient is off stretcher/[place] to call system/Physically safe environment - no spills, clutter or unnecessary equipment/Purposeful Proactive Rounding/Room/bathroom lighting operational, light cord in reach

## 2023-05-31 ENCOUNTER — RX ONLY (OUTPATIENT)
Age: 83
Setting detail: RX ONLY
End: 2023-05-31

## 2023-05-31 RX ORDER — FLUOROURACIL 2 G/40G
CREAM TOPICAL
Qty: 40 | Refills: 0 | Status: ERX | COMMUNITY
Start: 2023-05-31

## 2023-11-29 ENCOUNTER — APPOINTMENT (RX ONLY)
Dept: URBAN - METROPOLITAN AREA CLINIC 102 | Facility: CLINIC | Age: 83
Setting detail: DERMATOLOGY
End: 2023-11-29

## 2023-11-29 DIAGNOSIS — Z85.828 PERSONAL HISTORY OF OTHER MALIGNANT NEOPLASM OF SKIN: ICD-10-CM | Status: RESOLVED

## 2023-11-29 DIAGNOSIS — L82.0 INFLAMED SEBORRHEIC KERATOSIS: ICD-10-CM

## 2023-11-29 DIAGNOSIS — L65.8 OTHER SPECIFIED NONSCARRING HAIR LOSS: ICD-10-CM

## 2023-11-29 DIAGNOSIS — Z41.9 ENCOUNTER FOR PROCEDURE FOR PURPOSES OTHER THAN REMEDYING HEALTH STATE, UNSPECIFIED: ICD-10-CM

## 2023-11-29 PROCEDURE — 99214 OFFICE O/P EST MOD 30 MIN: CPT | Mod: 25

## 2023-11-29 PROCEDURE — ? ADDITIONAL NOTES

## 2023-11-29 PROCEDURE — 17110 DESTRUCTION B9 LES UP TO 14: CPT

## 2023-11-29 PROCEDURE — ? COUNSELING

## 2023-11-29 PROCEDURE — ? PRESCRIPTION MEDICATION MANAGEMENT

## 2023-11-29 PROCEDURE — ? LIQUID NITROGEN

## 2023-11-29 ASSESSMENT — LOCATION DETAILED DESCRIPTION DERM
LOCATION DETAILED: RIGHT CENTRAL ZYGOMA
LOCATION DETAILED: LEFT MEDIAL FRONTAL SCALP
LOCATION DETAILED: RIGHT FOREHEAD
LOCATION DETAILED: RIGHT MEDIAL FRONTAL SCALP
LOCATION DETAILED: RIGHT KNEE
LOCATION DETAILED: LEFT CENTRAL MALAR CHEEK
LOCATION DETAILED: LEFT SUPERIOR CENTRAL BUCCAL CHEEK
LOCATION DETAILED: LEFT FOREHEAD

## 2023-11-29 ASSESSMENT — LOCATION SIMPLE DESCRIPTION DERM
LOCATION SIMPLE: RIGHT KNEE
LOCATION SIMPLE: LEFT CHEEK
LOCATION SIMPLE: RIGHT ZYGOMA
LOCATION SIMPLE: LEFT SCALP
LOCATION SIMPLE: RIGHT FOREHEAD
LOCATION SIMPLE: LEFT FOREHEAD
LOCATION SIMPLE: RIGHT SCALP

## 2023-11-29 ASSESSMENT — LOCATION ZONE DERM
LOCATION ZONE: FACE
LOCATION ZONE: SCALP
LOCATION ZONE: LEG

## 2023-11-29 NOTE — PROCEDURE: COUNSELING
Patient Specific Counseling (Will Not Stick From Patient To Patient): Patient had it excised at outside practice.
Detail Level: Detailed

## 2023-11-29 NOTE — PROCEDURE: PRESCRIPTION MEDICATION MANAGEMENT
Detail Level: Zone
Initiate Treatment: Rx minoxidil 1.25mg daily pending cardiologist approval \\nTopical minoxidil 5% daily
Render In Strict Bullet Format?: No

## 2023-11-29 NOTE — PROCEDURE: LIQUID NITROGEN
Medical Necessity Clause: This procedure was medically necessary because the lesions that were treated were:
Consent: The patient's or parent’s consent was obtained and risks and benefits were reviewed.
Include Z78.9 (Other Specified Conditions Influencing Health Status) As An Associated Diagnosis?: No
Medical Necessity Information: It is in your best interest to select a reason for this procedure from the list below. All of these items fulfill various CMS LCD requirements except the new and changing color options.
Spray Paint Text: The liquid nitrogen was applied to the skin utilizing a spray paint frosting technique.
Post-Care Instructions: I reviewed with the patient in detail post-care instructions. Patient is to wear sunprotection, and avoid picking at any of the treated lesions. Pt may apply Vaseline to crusted or scabbing areas.
Show Applicator Variable?: Yes
Detail Level: Detailed

## 2023-11-29 NOTE — PROCEDURE: ADDITIONAL NOTES
Detail Level: Simple
Render Risk Assessment In Note?: no
Additional Notes: Recommend Dr. Pratima Baker for fillers and cosmetic options.
Additional Notes: Recommended minoxidil 1.25 mg and to get ok from cardiologist to start\\nRecommended nutrufol supplement

## 2024-05-29 ENCOUNTER — APPOINTMENT (RX ONLY)
Dept: URBAN - METROPOLITAN AREA CLINIC 102 | Facility: CLINIC | Age: 84
Setting detail: DERMATOLOGY
End: 2024-05-29

## 2024-05-29 DIAGNOSIS — L91.8 OTHER HYPERTROPHIC DISORDERS OF THE SKIN: ICD-10-CM

## 2024-05-29 DIAGNOSIS — D22 MELANOCYTIC NEVI: ICD-10-CM

## 2024-05-29 DIAGNOSIS — L81.4 OTHER MELANIN HYPERPIGMENTATION: ICD-10-CM

## 2024-05-29 DIAGNOSIS — L90.5 SCAR CONDITIONS AND FIBROSIS OF SKIN: ICD-10-CM

## 2024-05-29 DIAGNOSIS — L82.1 OTHER SEBORRHEIC KERATOSIS: ICD-10-CM

## 2024-05-29 DIAGNOSIS — Z85.828 PERSONAL HISTORY OF OTHER MALIGNANT NEOPLASM OF SKIN: ICD-10-CM

## 2024-05-29 PROBLEM — D22.61 MELANOCYTIC NEVI OF RIGHT UPPER LIMB, INCLUDING SHOULDER: Status: ACTIVE | Noted: 2024-05-29

## 2024-05-29 PROBLEM — D22.62 MELANOCYTIC NEVI OF LEFT UPPER LIMB, INCLUDING SHOULDER: Status: ACTIVE | Noted: 2024-05-29

## 2024-05-29 PROBLEM — D22.72 MELANOCYTIC NEVI OF LEFT LOWER LIMB, INCLUDING HIP: Status: ACTIVE | Noted: 2024-05-29

## 2024-05-29 PROBLEM — D22.71 MELANOCYTIC NEVI OF RIGHT LOWER LIMB, INCLUDING HIP: Status: ACTIVE | Noted: 2024-05-29

## 2024-05-29 PROBLEM — D22.5 MELANOCYTIC NEVI OF TRUNK: Status: ACTIVE | Noted: 2024-05-29

## 2024-05-29 PROCEDURE — ? COUNSELING

## 2024-05-29 PROCEDURE — ? SKIN TAG REMOVAL

## 2024-05-29 PROCEDURE — 99213 OFFICE O/P EST LOW 20 MIN: CPT | Mod: 25

## 2024-05-29 PROCEDURE — 11200 RMVL SKIN TAGS UP TO&INC 15: CPT

## 2024-05-29 ASSESSMENT — LOCATION DETAILED DESCRIPTION DERM
LOCATION DETAILED: RIGHT AXILLARY TAIL OF BREAST
LOCATION DETAILED: LEFT SUPERIOR LATERAL UPPER BACK
LOCATION DETAILED: LEFT ANTERIOR DISTAL THIGH
LOCATION DETAILED: LOWER STERNUM
LOCATION DETAILED: RIGHT VENTRAL PROXIMAL FOREARM
LOCATION DETAILED: RIGHT DISTAL PRETIBIAL REGION
LOCATION DETAILED: LEFT ANTERIOR DISTAL UPPER ARM
LOCATION DETAILED: RIGHT CENTRAL MALAR CHEEK
LOCATION DETAILED: EPIGASTRIC SKIN
LOCATION DETAILED: RIGHT PROXIMAL PRETIBIAL REGION
LOCATION DETAILED: RIGHT ANTERIOR DISTAL THIGH
LOCATION DETAILED: RIGHT ANTERIOR DISTAL UPPER ARM
LOCATION DETAILED: LEFT DISTAL PRETIBIAL REGION
LOCATION DETAILED: LEFT VENTRAL PROXIMAL FOREARM
LOCATION DETAILED: RIGHT SUPERIOR LATERAL UPPER BACK

## 2024-05-29 ASSESSMENT — LOCATION ZONE DERM
LOCATION ZONE: FACE
LOCATION ZONE: TRUNK
LOCATION ZONE: ARM
LOCATION ZONE: LEG

## 2024-05-29 ASSESSMENT — LOCATION SIMPLE DESCRIPTION DERM
LOCATION SIMPLE: LEFT UPPER BACK
LOCATION SIMPLE: RIGHT UPPER ARM
LOCATION SIMPLE: LEFT UPPER ARM
LOCATION SIMPLE: ABDOMEN
LOCATION SIMPLE: LEFT PRETIBIAL REGION
LOCATION SIMPLE: RIGHT THIGH
LOCATION SIMPLE: RIGHT FOREARM
LOCATION SIMPLE: RIGHT BACK
LOCATION SIMPLE: RIGHT BREAST
LOCATION SIMPLE: LEFT FOREARM
LOCATION SIMPLE: CHEST
LOCATION SIMPLE: LEFT THIGH
LOCATION SIMPLE: RIGHT PRETIBIAL REGION
LOCATION SIMPLE: RIGHT CHEEK

## 2024-05-29 NOTE — PROCEDURE: SKIN TAG REMOVAL
Detail Level: Detailed
Medical Necessity Information: It is in your best interest to select a reason for this procedure from the list below. All of these items fulfill various CMS LCD requirements except the new and changing color options.
Add Associated Diagnoses If Applicable When Selecting Medical Necessity: Yes
Consent obtained and the risks of skin tag removal was reviewed.
Anesthesia Volume In Cc: 3
Medical Necessity Clause: This procedure was medically necessary because the lesions that were treated were:
Include Z78.9 (Other Specified Conditions Influencing Health Status) As An Associated Diagnosis?: No
Anesthesia Type: 1% lidocaine with epinephrine

## 2024-05-29 NOTE — PROCEDURE: REASSURANCE
Additional Notes (Optional): Possibly a resolving ISK LN2 site from another dermatologist
Hide Additional Notes?: No
Detail Level: Detailed

## 2025-07-16 ENCOUNTER — APPOINTMENT (OUTPATIENT)
Dept: URBAN - METROPOLITAN AREA CLINIC 102 | Facility: CLINIC | Age: 85
Setting detail: DERMATOLOGY
End: 2025-07-16

## 2025-07-16 DIAGNOSIS — L82.0 INFLAMED SEBORRHEIC KERATOSIS: ICD-10-CM

## 2025-07-16 DIAGNOSIS — L82.1 OTHER SEBORRHEIC KERATOSIS: ICD-10-CM

## 2025-07-16 DIAGNOSIS — I83.9 ASYMPTOMATIC VARICOSE VEINS OF LOWER EXTREMITIES: ICD-10-CM

## 2025-07-16 DIAGNOSIS — L81.4 OTHER MELANIN HYPERPIGMENTATION: ICD-10-CM

## 2025-07-16 DIAGNOSIS — L65.9 NONSCARRING HAIR LOSS, UNSPECIFIED: ICD-10-CM

## 2025-07-16 DIAGNOSIS — Z85.828 PERSONAL HISTORY OF OTHER MALIGNANT NEOPLASM OF SKIN: ICD-10-CM

## 2025-07-16 DIAGNOSIS — D22 MELANOCYTIC NEVI: ICD-10-CM

## 2025-07-16 DIAGNOSIS — D18.0 HEMANGIOMA: ICD-10-CM

## 2025-07-16 DIAGNOSIS — I78.8 OTHER DISEASES OF CAPILLARIES: ICD-10-CM

## 2025-07-16 PROBLEM — D22.61 MELANOCYTIC NEVI OF RIGHT UPPER LIMB, INCLUDING SHOULDER: Status: ACTIVE | Noted: 2025-07-16

## 2025-07-16 PROBLEM — D22.39 MELANOCYTIC NEVI OF OTHER PARTS OF FACE: Status: ACTIVE | Noted: 2025-07-16

## 2025-07-16 PROBLEM — D22.72 MELANOCYTIC NEVI OF LEFT LOWER LIMB, INCLUDING HIP: Status: ACTIVE | Noted: 2025-07-16

## 2025-07-16 PROBLEM — D22.71 MELANOCYTIC NEVI OF RIGHT LOWER LIMB, INCLUDING HIP: Status: ACTIVE | Noted: 2025-07-16

## 2025-07-16 PROBLEM — D22.5 MELANOCYTIC NEVI OF TRUNK: Status: ACTIVE | Noted: 2025-07-16

## 2025-07-16 PROBLEM — I83.93 ASYMPTOMATIC VARICOSE VEINS OF BILATERAL LOWER EXTREMITIES: Status: ACTIVE | Noted: 2025-07-16

## 2025-07-16 PROBLEM — D18.01 HEMANGIOMA OF SKIN AND SUBCUTANEOUS TISSUE: Status: ACTIVE | Noted: 2025-07-16

## 2025-07-16 PROBLEM — D22.62 MELANOCYTIC NEVI OF LEFT UPPER LIMB, INCLUDING SHOULDER: Status: ACTIVE | Noted: 2025-07-16

## 2025-07-16 PROCEDURE — ?

## 2025-07-16 PROCEDURE — ? PRESCRIPTION

## 2025-07-16 PROCEDURE — ? COUNSELING

## 2025-07-16 PROCEDURE — ? LIQUID NITROGEN

## 2025-07-16 PROCEDURE — ? ADDITIONAL NOTES

## 2025-07-16 PROCEDURE — ?: Mod: 25

## 2025-07-16 RX ORDER — MINOXIDIL 2.5 MG/1
TABLET ORAL
Qty: 30 | Refills: 3 | Status: ERX | COMMUNITY
Start: 2025-07-16

## 2025-07-16 RX ADMIN — MINOXIDIL: 2.5 TABLET ORAL at 00:00

## 2025-07-16 ASSESSMENT — LOCATION DETAILED DESCRIPTION DERM
LOCATION DETAILED: LEFT MEDIAL SUPERIOR CHEST
LOCATION DETAILED: RIGHT MEDIAL SUPERIOR CHEST
LOCATION DETAILED: EPIGASTRIC SKIN
LOCATION DETAILED: RIGHT MEDIAL BREAST 4-5:00 REGION
LOCATION DETAILED: LEFT ANTERIOR PROXIMAL THIGH
LOCATION DETAILED: LEFT INFRAMAMMARY CREASE (INNER QUADRANT)
LOCATION DETAILED: LEFT PROXIMAL PRETIBIAL REGION
LOCATION DETAILED: LEFT PROXIMAL DORSAL FOREARM
LOCATION DETAILED: LEFT LATERAL SUPERIOR CHEST
LOCATION DETAILED: LEFT CENTRAL MALAR CHEEK
LOCATION DETAILED: RIGHT PROXIMAL RADIAL DORSAL FOREARM
LOCATION DETAILED: LEFT PROXIMAL CALF
LOCATION DETAILED: RIGHT DISTAL POSTERIOR THIGH
LOCATION DETAILED: RIGHT ANTERIOR PROXIMAL THIGH
LOCATION DETAILED: LEFT MEDIAL UPPER BACK
LOCATION DETAILED: RIGHT MEDIAL UPPER BACK
LOCATION DETAILED: RIGHT DISTAL PRETIBIAL REGION
LOCATION DETAILED: LEFT INFERIOR UPPER BACK
LOCATION DETAILED: LEFT KNEE
LOCATION DETAILED: RIGHT INFERIOR CENTRAL MALAR CHEEK
LOCATION DETAILED: PERIUMBILICAL SKIN
LOCATION DETAILED: LEFT SUPERIOR LATERAL LOWER BACK
LOCATION DETAILED: RIGHT PROXIMAL POSTERIOR UPPER ARM
LOCATION DETAILED: RIGHT LATERAL SUPERIOR CHEST
LOCATION DETAILED: RIGHT PROXIMAL PRETIBIAL REGION
LOCATION DETAILED: RIGHT ANTERIOR SHOULDER
LOCATION DETAILED: LEFT SUPERIOR UPPER BACK
LOCATION DETAILED: RIGHT ANTERIOR DISTAL THIGH
LOCATION DETAILED: LEFT ANTERIOR DISTAL THIGH
LOCATION DETAILED: LEFT SUPERIOR PARIETAL SCALP
LOCATION DETAILED: RIGHT INFERIOR MEDIAL MIDBACK
LOCATION DETAILED: LEFT MEDIAL FRONTAL SCALP
LOCATION DETAILED: LEFT INFERIOR CENTRAL MALAR CHEEK
LOCATION DETAILED: INFERIOR THORACIC SPINE
LOCATION DETAILED: LEFT DISTAL POSTERIOR THIGH
LOCATION DETAILED: LEFT DISTAL PRETIBIAL REGION
LOCATION DETAILED: LEFT DISTAL CALF
LOCATION DETAILED: RIGHT PROXIMAL CALF

## 2025-07-16 ASSESSMENT — LOCATION SIMPLE DESCRIPTION DERM
LOCATION SIMPLE: LEFT CHEEK
LOCATION SIMPLE: UPPER BACK
LOCATION SIMPLE: LEFT THIGH
LOCATION SIMPLE: LEFT LOWER BACK
LOCATION SIMPLE: CHEST
LOCATION SIMPLE: RIGHT POSTERIOR THIGH
LOCATION SIMPLE: RIGHT LOWER BACK
LOCATION SIMPLE: RIGHT FOREARM
LOCATION SIMPLE: RIGHT BREAST
LOCATION SIMPLE: LEFT UPPER BACK
LOCATION SIMPLE: ABDOMEN
LOCATION SIMPLE: LEFT PRETIBIAL REGION
LOCATION SIMPLE: RIGHT CALF
LOCATION SIMPLE: LEFT BREAST
LOCATION SIMPLE: RIGHT THIGH
LOCATION SIMPLE: RIGHT SHOULDER
LOCATION SIMPLE: LEFT SCALP
LOCATION SIMPLE: RIGHT CHEEK
LOCATION SIMPLE: LEFT CALF
LOCATION SIMPLE: RIGHT POSTERIOR UPPER ARM
LOCATION SIMPLE: RIGHT PRETIBIAL REGION
LOCATION SIMPLE: LEFT POSTERIOR THIGH
LOCATION SIMPLE: SCALP
LOCATION SIMPLE: RIGHT UPPER BACK
LOCATION SIMPLE: LEFT KNEE
LOCATION SIMPLE: LEFT FOREARM

## 2025-07-16 ASSESSMENT — LOCATION ZONE DERM
LOCATION ZONE: TRUNK
LOCATION ZONE: ARM
LOCATION ZONE: SCALP
LOCATION ZONE: LEG
LOCATION ZONE: FACE

## 2025-07-16 NOTE — PROCEDURE: MIPS QUALITY
Quality 47: Advance Care Plan: Advance Care Planning discussed and documented; advance care plan or surrogate decision maker documented in the medical record.
Quality 226: Preventive Care And Screening: Tobacco Use: Screening And Cessation Intervention: Patient screened for tobacco use and is an ex/non-smoker
Quality 508: Adult Covid-19 Vaccination Status: Patients who are not up to date on their COVID-19 vaccinations as defined by CDC recommendations on current vaccination
Detail Level: Detailed